# Patient Record
Sex: MALE | Race: WHITE | NOT HISPANIC OR LATINO | Employment: OTHER | URBAN - METROPOLITAN AREA
[De-identification: names, ages, dates, MRNs, and addresses within clinical notes are randomized per-mention and may not be internally consistent; named-entity substitution may affect disease eponyms.]

---

## 2017-02-06 RX ORDER — LATANOPROST 50 UG/ML
SOLUTION/ DROPS OPHTHALMIC
Qty: 3 BOTTLE | Refills: 3 | Status: SHIPPED | OUTPATIENT
Start: 2017-02-06 | End: 2017-07-31 | Stop reason: SDUPTHER

## 2017-04-07 ENCOUNTER — OFFICE VISIT (OUTPATIENT)
Dept: OPHTHALMOLOGY | Facility: CLINIC | Age: 62
End: 2017-04-07
Payer: COMMERCIAL

## 2017-04-07 ENCOUNTER — TELEPHONE (OUTPATIENT)
Dept: OPHTHALMOLOGY | Facility: CLINIC | Age: 62
End: 2017-04-07

## 2017-04-07 DIAGNOSIS — T15.02XA FOREIGN BODY IN CORNEA, LEFT, INITIAL ENCOUNTER: Primary | ICD-10-CM

## 2017-04-07 DIAGNOSIS — H40.1131 PRIMARY OPEN ANGLE GLAUCOMA OF BOTH EYES, MILD STAGE: ICD-10-CM

## 2017-04-07 DIAGNOSIS — H26.9 CORTICAL CATARACT OF BOTH EYES: ICD-10-CM

## 2017-04-07 PROCEDURE — 65222 REMOVE FOREIGN BODY FROM EYE: CPT | Mod: LT,S$GLB,, | Performed by: OPHTHALMOLOGY

## 2017-04-07 PROCEDURE — 99999 PR PBB SHADOW E&M-EST. PATIENT-LVL II: CPT | Mod: PBBFAC,,, | Performed by: OPHTHALMOLOGY

## 2017-04-07 PROCEDURE — 92012 INTRM OPH EXAM EST PATIENT: CPT | Mod: 25,S$GLB,, | Performed by: OPHTHALMOLOGY

## 2017-04-07 RX ORDER — GLIPIZIDE 5 MG/1
5 TABLET ORAL
Status: ON HOLD | COMMUNITY
End: 2023-04-05 | Stop reason: HOSPADM

## 2017-04-07 RX ORDER — LISINOPRIL 5 MG/1
5 TABLET ORAL DAILY
Status: ON HOLD | COMMUNITY
End: 2023-04-05 | Stop reason: HOSPADM

## 2017-04-07 RX ORDER — GATIFLOXACIN 5 MG/ML
1 SOLUTION/ DROPS OPHTHALMIC 4 TIMES DAILY
Qty: 1 BOTTLE | Refills: 1 | Status: SHIPPED | OUTPATIENT
Start: 2017-04-07 | End: 2017-04-14

## 2017-04-07 RX ORDER — ESCITALOPRAM OXALATE 20 MG/1
20 TABLET ORAL DAILY
Status: ON HOLD | COMMUNITY
End: 2023-04-05 | Stop reason: HOSPADM

## 2017-04-07 RX ORDER — ATORVASTATIN CALCIUM 20 MG/1
20 TABLET, FILM COATED ORAL NIGHTLY
COMMUNITY

## 2017-04-07 NOTE — TELEPHONE ENCOUNTER
----- Message from Teresa Lim sent at 4/7/2017  9:35 AM CDT -----  Contact: pt  Calling about eyedrops being to high in cost pls advise [phone] thx

## 2017-04-07 NOTE — TELEPHONE ENCOUNTER
----- Message from Elizabeth Carbajal sent at 4/7/2017  9:28 AM CDT -----  Contact: [facility]   [facility]  [phone],,, calling about patient prescption

## 2017-04-07 NOTE — TELEPHONE ENCOUNTER
L/M ON RECORDER THAT DR. CUELLO IS CHANGING HIM FROM THE GATIFLOXACIN TO POLYTRIM AND TO USE LACRILUBE OINTMENT IN PLACE OF THE TOBRAMYCIN OINTMENT.

## 2017-04-07 NOTE — TELEPHONE ENCOUNTER
----- Message from Elizabeth Carbajal sent at 4/7/2017  9:28 AM CDT -----  Contact: [facility]   [facility]  [phone],,, calling about patient prescption

## 2017-04-07 NOTE — PROGRESS NOTES
HPI     Patient was a patient of yours approx. 2 years ago and left due to change   in insurance now you take his insurance again and he is back to restablish   care with you, patient has been going to [facility] in [place]   and [facility] states he does not have glaucoma, patient was  [employer] 2   weeks ago in [place]  and states he felt  like something went  Into  Os and   is  moving around ,  patient now states he has extreme photophobia for 2-3   days.              Mild Stage Glaucoma  +DM X 1 YEAR?  Glaucoma Laser OD at [place] x early 2015 - no response per patient      OU LATANOPROST QHS        Last edited by Rony Friedman MD on 4/7/2017  8:56 AM.         Assessment /Plan     For exam results, see Encounter Report.      ICD-10-CM ICD-9-CM    1. Foreign body in cornea, left, initial encounter T15.02XA 930.0 Removed today from the left eye cornea, without any difficulties    tobramycin sulfate 0.3% (TOBREX) 0.3 % ophthalmic ointment     E914 gatifloxacin (ZYMAR) 0.5 % Drop drops   2. Primary open angle glaucoma of both eyes, mild stage H40.1131 365.11 GOCT today, will defer IOP today due to abrasion     365.71    3. Cortical cataract of both eyes H26.9 366.9        GOCT Done today     RETURN TO CLINIC Monday     Tobrez QID OS and Gati QID OS

## 2017-04-10 ENCOUNTER — OFFICE VISIT (OUTPATIENT)
Dept: OPHTHALMOLOGY | Facility: CLINIC | Age: 62
End: 2017-04-10
Payer: COMMERCIAL

## 2017-04-10 DIAGNOSIS — H40.1131 PRIMARY OPEN ANGLE GLAUCOMA OF BOTH EYES, MILD STAGE: ICD-10-CM

## 2017-04-10 DIAGNOSIS — T15.02XA FOREIGN BODY IN CORNEA, LEFT, INITIAL ENCOUNTER: Primary | ICD-10-CM

## 2017-04-10 DIAGNOSIS — H26.9 CORTICAL CATARACT OF BOTH EYES: ICD-10-CM

## 2017-04-10 PROCEDURE — 99999 PR PBB SHADOW E&M-EST. PATIENT-LVL II: CPT | Mod: PBBFAC,,, | Performed by: OPHTHALMOLOGY

## 2017-04-10 PROCEDURE — 92012 INTRM OPH EXAM EST PATIENT: CPT | Mod: S$GLB,,, | Performed by: OPHTHALMOLOGY

## 2017-04-10 RX ORDER — POLYMYXIN B SULFATE AND TRIMETHOPRIM 1; 10000 MG/ML; [USP'U]/ML
1 SOLUTION OPHTHALMIC 4 TIMES DAILY
COMMUNITY
End: 2017-04-17 | Stop reason: ALTCHOICE

## 2017-04-10 RX ORDER — OMEPRAZOLE 10 MG/1
10 CAPSULE, DELAYED RELEASE ORAL DAILY
Status: ON HOLD | COMMUNITY
End: 2023-04-05 | Stop reason: HOSPADM

## 2017-04-10 NOTE — PROGRESS NOTES
HPI     F/u K-abrasion OS.    Glaucoma  +DM X 1 YEAR?  Glaucoma Laser OD at [place] x early 2015 - no response per patient  Corneal FB removed OS 04/07/17      OU LATANOPROST QHS  Polytrim qid OS  Refresh gel drop         Last edited by Marcie Thacker on 4/10/2017  9:01 AM.         Assessment /Plan     For exam results, see Encounter Report.      ICD-10-CM ICD-9-CM    1. Foreign body in cornea, left,  T15.02XA 930.0 Removed at last visit - today defect closed, with mild stromal infiltrate still present      E914    2. Primary open angle glaucoma of both eyes, mild stage H40.1131 365.11      365.71    3. Cortical cataract of both eyes H26.9 366.9        RETURN TO CLINIC 1 week  (might check IOP at next visit if k is better)       OU LATANOPROST QHS  Polytrim qid OS  Refresh gel drop

## 2017-04-17 ENCOUNTER — OFFICE VISIT (OUTPATIENT)
Dept: OPHTHALMOLOGY | Facility: CLINIC | Age: 62
End: 2017-04-17
Payer: COMMERCIAL

## 2017-04-17 DIAGNOSIS — Z83.511 FAMILY HISTORY OF OPEN-ANGLE GLAUCOMA: ICD-10-CM

## 2017-04-17 DIAGNOSIS — H26.9 CORTICAL CATARACT OF BOTH EYES: ICD-10-CM

## 2017-04-17 DIAGNOSIS — H40.1131 PRIMARY OPEN ANGLE GLAUCOMA OF BOTH EYES, MILD STAGE: Primary | ICD-10-CM

## 2017-04-17 PROCEDURE — 92012 INTRM OPH EXAM EST PATIENT: CPT | Mod: S$GLB,,, | Performed by: OPHTHALMOLOGY

## 2017-04-17 PROCEDURE — 99999 PR PBB SHADOW E&M-EST. PATIENT-LVL II: CPT | Mod: PBBFAC,,, | Performed by: OPHTHALMOLOGY

## 2017-04-17 RX ORDER — DORZOLAMIDE HYDROCHLORIDE AND TIMOLOL MALEATE 20; 5 MG/ML; MG/ML
1 SOLUTION/ DROPS OPHTHALMIC EVERY 12 HOURS
Qty: 1 BOTTLE | Refills: 6 | Status: SHIPPED | OUTPATIENT
Start: 2017-04-17 | End: 2018-04-04 | Stop reason: SDUPTHER

## 2017-04-17 NOTE — PROGRESS NOTES
HPI     Glaucoma  +DM X 1 YEAR?  Glaucoma Laser OD at [facility] x early 2015 - no response per patient  Corneal FB removed OS 04/07/17      OU LATANOPROST QHS  Polytrim QID OS   Refresh Gel drops OS       Last edited by RITA Sanches on 4/17/2017  8:12 AM.         Assessment /Plan     For exam results, see Encounter Report.      ICD-10-CM ICD-9-CM    1. Primary open angle glaucoma of both eyes, mild stage H40.1131 365.11 IOP not within acceptable range relative to target IOP with risk of irreversible visual loss. Additional treatment required.  Discussed options, risks, and benefits of additional medication, SLT laser, or incisional glaucoma surgery.     recommend additional med    Patient chooses above    Reviewed importance of continued compliance with treatment and follow up.   Start:  dorzolamide-timolol 2-0.5% (COSOPT) 22.3-6.8 mg/mL ophthalmic solution bid ou     365.71    2. Cortical cataract of both eyes H26.9 366.9 follow   3. Family history of open-angle glaucoma Z83.511 V19.11        Please use your drops as follows:    Dorzolamide/Timolol in both eyes(s) twice a day    It is best to place the first drop in your eyes when you first wake up to start your day.      Latanoprost once a day in both eyes(s)    Use this medication at the time of day that is easiest for you to remember.    Please wipe the excess of this medication off the eyelid skin after instillation and place pressure on the lids near your nose for 1-2 minutes after using any of your eye medications.     Return to Clinic 1 months iop

## 2017-06-12 ENCOUNTER — OFFICE VISIT (OUTPATIENT)
Dept: OPHTHALMOLOGY | Facility: CLINIC | Age: 62
End: 2017-06-12
Payer: COMMERCIAL

## 2017-06-12 DIAGNOSIS — H40.1131 PRIMARY OPEN ANGLE GLAUCOMA OF BOTH EYES, MILD STAGE: Primary | ICD-10-CM

## 2017-06-12 DIAGNOSIS — H26.9 CORTICAL CATARACT OF BOTH EYES: ICD-10-CM

## 2017-06-12 DIAGNOSIS — Z83.511 FAMILY HISTORY OF OPEN-ANGLE GLAUCOMA: ICD-10-CM

## 2017-06-12 PROCEDURE — 99999 PR PBB SHADOW E&M-EST. PATIENT-LVL II: CPT | Mod: PBBFAC,,, | Performed by: OPHTHALMOLOGY

## 2017-06-12 PROCEDURE — 92012 INTRM OPH EXAM EST PATIENT: CPT | Mod: S$GLB,,, | Performed by: OPHTHALMOLOGY

## 2017-06-12 NOTE — PROGRESS NOTES
HPI     Here for IOP check.  Trial of add Cosopt.  Had some irritation for a   couple of days. Pt missed his new drop this am, wasn't sure if he should   take it the day of the appt     Glaucoma  +DM X 1 YEAR?  Glaucoma Laser OD at [facility] x early 2015 - no response per patient  Corneal FB removed OS 04/07/17    COSOPT bid OU--Didn't use this a.m.  OU LATANOPROST QHS  Polytrim QID OS   Refresh Gel drops OS    Last edited by Rony Friedman MD on 6/12/2017  9:04 AM. (History)            Assessment /Plan     For exam results, see Encounter Report.      ICD-10-CM ICD-9-CM    1. Primary open angle glaucoma of both eyes, mild stage H40.1131 365.11 IOP much better on 2nd med today  Could consider stopping latanoprost in the future as a trial   Follow for now      365.71    2. Cortical cataract of both eyes H26.9 366.9 Follow    3. Family history of open-angle glaucoma Z83.511 V19.11 Follow        Please use your drops as follows:    Dorzolamide/Timolol in both eyes(s) twice a day    It is best to place the first drop in your eyes when you first wake up to start your day.      Latanoprost once a day in both eyes(s)    Use this medication at the time of day that is easiest for you to remember.    Please wipe the excess of this medication off the eyelid skin after instillation and place pressure on the lids near your nose for 1-2 minutes after using any of your eye medications.     Return to Clinic 2  months DOA, HVF and SDP

## 2017-07-27 ENCOUNTER — OFFICE VISIT (OUTPATIENT)
Dept: OPHTHALMOLOGY | Facility: CLINIC | Age: 62
End: 2017-07-27
Payer: COMMERCIAL

## 2017-07-27 DIAGNOSIS — H25.13 NUCLEAR SCLEROSIS, BILATERAL: ICD-10-CM

## 2017-07-27 DIAGNOSIS — H26.9 CORTICAL CATARACT OF BOTH EYES: ICD-10-CM

## 2017-07-27 DIAGNOSIS — H43.812 PVD (POSTERIOR VITREOUS DETACHMENT), LEFT: Primary | ICD-10-CM

## 2017-07-27 PROCEDURE — 99999 PR PBB SHADOW E&M-EST. PATIENT-LVL II: CPT | Mod: PBBFAC,,, | Performed by: OPHTHALMOLOGY

## 2017-07-27 PROCEDURE — 92014 COMPRE OPH EXAM EST PT 1/>: CPT | Mod: S$GLB,,, | Performed by: OPHTHALMOLOGY

## 2017-07-27 NOTE — PROGRESS NOTES
SUBJECTIVE:   Sixto Joseph is a 62 y.o. male   Uncorrected distance visual acuity was 20/20 in the right eye and 20/20 -2 in the left eye.   Chief Complaint   Patient presents with    Spots and/or Floaters     states he has a semi [place] looking floater in OS. started this am.     Glaucoma     mgm coag pt. latanoprost qhs ou, dorz/timolol bid ou        HPI:  HPI     Spots and/or Floaters    Additional comments: states he has a semi [place] looking floater in OS.   started this am.            Glaucoma    Additional comments: mgm coag pt. latanoprost qhs ou, dorz/timolol bid ou             Comments   Pt states he has been seeing a semi [place] in OS starting this morning. At   first it was just when he turned his head. But then he started noticing it   as well while driving. Also states he sees flashing lights after he   coughs.     Glaucoma  +DM X 1 YEAR?  Glaucoma Laser OD at [facility] x early 2015 - no response per patient  Corneal FB removed OS 04/07/17      OU LATANOPROST QHS, dorz/lea bid ou  Refresh Gel drops OS       Last edited by Monika Santillan MA on 7/27/2017  3:04 PM. (History)        Assessment /Plan :  1. PVD (posterior vitreous detachment), left Patient seen and evaluated for PVD OS. No tears or breaks were seen after careful retinal evaluation. Discussed retinal detachment signs and symptoms including flashes of lights, floaters, perceived curtains or veils. Advised to patient to monitor visual status including increase in flashes and floaters, or the development of visual field changes including curtain and /or veils. Advised patient to RTC urgently if these symptoms occur. Explained the need for follow up exams to the patient even if there are no changes in the symptoms.    Consult Dr. Groves      2. Cortical cataract of both eyes monitor for now   3. Nuclear sclerosis, bilateral monitor for now

## 2017-07-27 NOTE — LETTER
[facility] - Ophthalmology  [address] [place] [address]  Phone: [phone]  Fax: [phone]   July 27, 2017    No Recipients    Patient: Sixto Joseph   MR Number: [id number]   YOB: 1955   Date of Visit: 7/27/2017       Dear Dr. Traore Recipients:    Thank you for referring Sixto Joseph to me for evaluation. Here is my assessment and plan of care:    Assessment:   /    Plan:   :  1. PVD (posterior vitreous detachment), left Patient seen and evaluated for PVD OS. No tears or breaks were seen after careful retinal evaluation. Discussed retinal detachment signs and symptoms including flashes of lights, floaters, perceived curtains or veils. Advised to patient to monitor visual status including increase in flashes and floaters, or the development of visual field changes including curtain and /or veils. Advised patient to RTC urgently if these symptoms occur. Explained the need for follow up exams to the patient even if there are no changes in the symptoms.    Consult Dr. Groves      2. Cortical cataract of both eyes monitor for now   3. Nuclear sclerosis, bilateral monitor for now                     Below you will find my full exam findings. If you have questions, please do not hesitate to call me. I look forward to following Mr. Sixto Joseph along with you.    Sincerely,        Mitchell Lynch MD       CC  No Recipients             Base Eye Exam     Visual Acuity (Snellen - Linear)       Right Left    Dist sc 20/20 20/20 -2          Tonometry (Applanation, 3:10 PM)       Right Left    Pressure 16 15          Target and Max Pressure       Right Left    Target 18 18    Max 26 (4/17/2017) 23 (4/17/2017)          Pupils       Pupils Dark Light Shape React APD    Right PERRL 4 3 Round 1 None    Left PERRL 4 3 Round 1 None          Extraocular Movement       Right Left     Full Full          Neuro/Psych     Oriented x3:  Yes    Mood/Affect:  Normal          Dilation     Both eyes:  2.5%  Phenylephrine, 1% Mydriacyl @ 3:10 PM            Slit Lamp and Fundus Exam     Slit Lamp Exam       Right Left    Lids/Lashes Normal Normal    Conjunctiva/Sclera White and quiet White and quiet    Cornea Clear Clear    Anterior Chamber Deep and quiet Deep and quiet    Iris Round and reactive Round and reactive    Lens 1+ NSC, 2+ Cortical cataract 1+ NSC, 2+ Cortical cataract    Vitreous Normal early PVD with rare vitreous cells          Fundus Exam       Right Left    Disc Normal Normal    C/D Ratio Vertical 0.50+ 0.5    Macula Normal Normal    Vessels Normal Normal    Periphery Normal Inferior RPE pigmetary changes

## 2017-07-27 NOTE — LETTER
[facility] - Ophthalmology  [address] [place] [address]  Phone: [phone]  Fax: [phone]   July 27, 2017    Rober Groves MD  [address] [place] [address]    Patient: Sixto Joseph   MR Number: [id number]   YOB: 1955   Date of Visit: 7/27/2017       Dear Dr. Groves:    Thank you for referring Sixto Joseph to me for evaluation. Here is my assessment and plan of care:    Assessment:   /    Plan:   :  1. PVD (posterior vitreous detachment), left Patient seen and evaluated for PVD OS. No tears or breaks were seen after careful retinal evaluation. Discussed retinal detachment signs and symptoms including flashes of lights, floaters, perceived curtains or veils. Advised to patient to monitor visual status including increase in flashes and floaters, or the development of visual field changes including curtain and /or veils. Advised patient to RTC urgently if these symptoms occur. Explained the need for follow up exams to the patient even if there are no changes in the symptoms.    Consult Dr. Groves      2. Cortical cataract of both eyes monitor for now   3. Nuclear sclerosis, bilateral monitor for now                     Below you will find my full exam findings. If you have questions, please do not hesitate to call me. I look forward to following Mr. Sixto Joseph along with you.    Sincerely,        Mitchell Lynch MD       CC  No Recipients             Base Eye Exam     Visual Acuity (Snellen - Linear)       Right Left    Dist sc 20/20 20/20 -2          Tonometry (Applanation, 3:10 PM)       Right Left    Pressure 16 15          Target and Max Pressure       Right Left    Target 18 18    Max 26 (4/17/2017) 23 (4/17/2017)          Pupils       Pupils Dark Light Shape React APD    Right PERRL 4 3 Round 1 None    Left PERRL 4 3 Round 1 None          Extraocular Movement       Right Left     Full Full          Neuro/Psych     Oriented x3:  Yes    Mood/Affect:   Normal          Dilation     Both eyes:  2.5% Phenylephrine, 1% Mydriacyl @ 3:10 PM            Slit Lamp and Fundus Exam     Slit Lamp Exam       Right Left    Lids/Lashes Normal Normal    Conjunctiva/Sclera White and quiet White and quiet    Cornea Clear Clear    Anterior Chamber Deep and quiet Deep and quiet    Iris Round and reactive Round and reactive    Lens 1+ NSC, 2+ Cortical cataract 1+ NSC, 2+ Cortical cataract    Vitreous Normal early PVD with rare vitreous cells          Fundus Exam       Right Left    Disc Normal Normal    C/D Ratio Vertical 0.50+ 0.5    Macula Normal Normal    Vessels Normal Normal    Periphery Normal Inferior RPE pigmetary changes

## 2017-07-31 RX ORDER — LATANOPROST 50 UG/ML
SOLUTION/ DROPS OPHTHALMIC
Qty: 3 BOTTLE | Refills: 3 | Status: SHIPPED | OUTPATIENT
Start: 2017-07-31 | End: 2017-10-09 | Stop reason: SDUPTHER

## 2017-08-30 ENCOUNTER — OFFICE VISIT (OUTPATIENT)
Dept: OPHTHALMOLOGY | Facility: CLINIC | Age: 62
End: 2017-08-30
Payer: COMMERCIAL

## 2017-08-30 DIAGNOSIS — H40.1131 PRIMARY OPEN ANGLE GLAUCOMA OF BOTH EYES, MILD STAGE: Primary | ICD-10-CM

## 2017-08-30 DIAGNOSIS — H26.9 CORTICAL CATARACT OF BOTH EYES: ICD-10-CM

## 2017-08-30 DIAGNOSIS — H25.13 NUCLEAR SCLEROSIS, BILATERAL: ICD-10-CM

## 2017-08-30 PROCEDURE — 92014 COMPRE OPH EXAM EST PT 1/>: CPT | Mod: S$GLB,,, | Performed by: OPHTHALMOLOGY

## 2017-08-30 PROCEDURE — 92250 FUNDUS PHOTOGRAPHY W/I&R: CPT | Mod: S$GLB,,, | Performed by: OPHTHALMOLOGY

## 2017-08-30 PROCEDURE — 92083 EXTENDED VISUAL FIELD XM: CPT | Mod: S$GLB,,, | Performed by: OPHTHALMOLOGY

## 2017-08-30 PROCEDURE — 99999 PR PBB SHADOW E&M-EST. PATIENT-LVL I: CPT | Mod: PBBFAC,,, | Performed by: OPHTHALMOLOGY

## 2017-08-30 RX ORDER — SITAGLIPTIN 100 MG/1
100 TABLET, FILM COATED ORAL NIGHTLY
COMMUNITY
Start: 2017-08-07

## 2017-08-30 RX ORDER — VENLAFAXINE HYDROCHLORIDE 37.5 MG/1
37.5 CAPSULE, EXTENDED RELEASE ORAL
Status: ON HOLD | COMMUNITY
Start: 2017-08-28 | End: 2023-04-05 | Stop reason: HOSPADM

## 2017-08-30 NOTE — PROGRESS NOTES
HPI     Glaucoma    Additional comments: OU LATANOPROST QHS, dorz/lea bid ou           Diabetic Eye Exam    Additional comments: Latest A1C was 5.4           Comments   Pt here for 2m HVF SDP chk. Pt saw Dr. Groves 7/28/17. Pt states that he   had a laser procedure for his PVD OS. Procedure was successful. Pt says he   didn't see any lines in his vision anymore, but his vision was cloudy and   was seeing flashes of light. Pt says the cloudiness is gone, but the   flashes still appear. No pain or discomfort.  95% compliant with gtts.     Glaucoma  +DM X 1 YEAR?  Glaucoma Laser OD at [place] x early 2015 - no response per patient  Corneal FB removed OS 04/07/17      OU LATANOPROST QHS, dorz/lea bid ou  Refresh Gel drops OS       Last edited by Raul Mendoza, Patient Care Assistant on 8/30/2017  8:43   AM. (History)            Assessment /Plan     For exam results, see Encounter Report.      ICD-10-CM ICD-9-CM    1. Primary open angle glaucoma of both eyes, mild stage H40.1131 365.11 Wilkins Visual Field - OU - Extended - Both Eyes     365.71 Color Fundus Photography - OU - Both Eyes    Doing well - intraocular pressure is within acceptable range relative to target pressure with no evidence of progression.   Continue current treatment.  Reviewed importance of continued compliance with treatment and follow up.       Will stop latanoprost pt may not need drop   2. Cortical cataract of both eyes H26.9 366.9 Will cont to follow   3. Nuclear sclerosis, bilateral H25.13 366.16 As above     D/C  LATANOPROST QHS   dorz/lea bid ou  Refresh Gel drops OS     RETURN TO CLINIC 1 month IOP check

## 2017-10-09 ENCOUNTER — OFFICE VISIT (OUTPATIENT)
Dept: OPHTHALMOLOGY | Facility: CLINIC | Age: 62
End: 2017-10-09
Payer: COMMERCIAL

## 2017-10-09 DIAGNOSIS — H26.9 CORTICAL CATARACT OF BOTH EYES: ICD-10-CM

## 2017-10-09 DIAGNOSIS — H40.1131 PRIMARY OPEN ANGLE GLAUCOMA OF BOTH EYES, MILD STAGE: Primary | ICD-10-CM

## 2017-10-09 DIAGNOSIS — Z83.511 FAMILY HISTORY OF OPEN-ANGLE GLAUCOMA: ICD-10-CM

## 2017-10-09 PROCEDURE — 92012 INTRM OPH EXAM EST PATIENT: CPT | Mod: S$GLB,,, | Performed by: OPHTHALMOLOGY

## 2017-10-09 PROCEDURE — 99999 PR PBB SHADOW E&M-EST. PATIENT-LVL I: CPT | Mod: PBBFAC,,, | Performed by: OPHTHALMOLOGY

## 2017-10-09 RX ORDER — LATANOPROST 50 UG/ML
1 SOLUTION/ DROPS OPHTHALMIC DAILY
Qty: 1 BOTTLE | Refills: 11 | Status: SHIPPED | OUTPATIENT
Start: 2017-10-09 | End: 2018-04-04 | Stop reason: SDUPTHER

## 2017-10-09 NOTE — PROGRESS NOTES
HPI     1 month IOP check.  Trial of DC latanoprost.  No vision changes.    Glaucoma  +DM X 1 YEAR?  Glaucoma Laser OD at [facility] x early 2015 - no response per patient  Corneal FB removed OS 04/07/17  Retinal laser ([facility] 2017)       dorz/lea bid ou  Refresh Gel drops OS    Last edited by Marcie Thacker on 10/9/2017  8:15 AM. (History)            Assessment /Plan     For exam results, see Encounter Report.      ICD-10-CM ICD-9-CM    1. Primary open angle glaucoma of both eyes, mild stage H40.1131 365.11 Needs IOP Lower, will need to add 2nd med-     latanoprost 0.005 % ophthalmic solution     365.71    2. Cortical cataract of both eyes H26.9 366.9 Follow    3. Family history of open-angle glaucoma Z83.511 V19.11        Please use your drops as follows:    Dorzolamide/Timolol in both eyes(s) twice a day    It is best to place the first drop in your eyes when you first wake up to start your day.      Latanoprost once a day in both eyes(s)    Use this medication at the time of day that is easiest for you to remember.    Please wipe the excess of this medication off the eyelid skin after instillation and place pressure on the lids near your nose for 1-2 minutes after using any of your eye medications.     Return to Clinic 4 months IOP at [facility]

## 2017-11-02 ENCOUNTER — PATIENT OUTREACH (OUTPATIENT)
Dept: ADMINISTRATIVE | Facility: HOSPITAL | Age: 62
End: 2017-11-02

## 2018-02-05 ENCOUNTER — OFFICE VISIT (OUTPATIENT)
Dept: OPHTHALMOLOGY | Facility: CLINIC | Age: 63
End: 2018-02-05
Payer: MEDICAID

## 2018-02-05 DIAGNOSIS — H40.1131 PRIMARY OPEN ANGLE GLAUCOMA OF BOTH EYES, MILD STAGE: Primary | ICD-10-CM

## 2018-02-05 PROCEDURE — 99999 PR PBB SHADOW E&M-EST. PATIENT-LVL I: CPT | Mod: PBBFAC,,, | Performed by: OPHTHALMOLOGY

## 2018-02-05 PROCEDURE — 99211 OFF/OP EST MAY X REQ PHY/QHP: CPT | Mod: PBBFAC | Performed by: OPHTHALMOLOGY

## 2018-02-05 PROCEDURE — 92012 INTRM OPH EXAM EST PATIENT: CPT | Mod: S$PBB,,, | Performed by: OPHTHALMOLOGY

## 2018-02-12 ENCOUNTER — TELEPHONE (OUTPATIENT)
Dept: OPHTHALMOLOGY | Facility: CLINIC | Age: 63
End: 2018-02-12

## 2018-02-12 NOTE — TELEPHONE ENCOUNTER
----- Message from Adia Stein sent at 2/12/2018  2:14 PM CST -----  Contact: [facility]  She's calling stating that the Rx Dorzo/Timol 2/0.5% eye drops are on back order, wanted to know if something else could be given, please advise [phone]

## 2018-04-04 DIAGNOSIS — H40.1131 PRIMARY OPEN ANGLE GLAUCOMA OF BOTH EYES, MILD STAGE: ICD-10-CM

## 2018-04-04 RX ORDER — LATANOPROST 50 UG/ML
1 SOLUTION/ DROPS OPHTHALMIC DAILY
Qty: 1 BOTTLE | Refills: 11 | Status: SHIPPED | OUTPATIENT
Start: 2018-04-04 | End: 2018-08-07 | Stop reason: SDUPTHER

## 2018-04-04 RX ORDER — DORZOLAMIDE HYDROCHLORIDE AND TIMOLOL MALEATE 20; 5 MG/ML; MG/ML
1 SOLUTION/ DROPS OPHTHALMIC 2 TIMES DAILY
Qty: 1 BOTTLE | Refills: 6 | Status: SHIPPED | OUTPATIENT
Start: 2018-04-04 | End: 2018-10-12 | Stop reason: SDUPTHER

## 2018-04-04 NOTE — TELEPHONE ENCOUNTER
----- Message from Shon Mckinley sent at 4/4/2018  1:38 PM CDT -----  Contact: Pt  Pt needs a refill on Rx eye drops no further information was given.    Pt can be reached at [phone].    Thank You.

## 2018-04-04 NOTE — TELEPHONE ENCOUNTER
----- Message from Crissy Quiroz sent at 4/4/2018  2:17 PM CDT -----  Contact: Pt   States he's calling rg his eye drops and told him that it was called in to the [facility]. Pt states if the medicine was called in to [facility] to pls have to sent to the [facility] and can be reached at [phone]//thanks/dbw

## 2018-06-04 DIAGNOSIS — H40.1131 PRIMARY OPEN ANGLE GLAUCOMA OF BOTH EYES, MILD STAGE: ICD-10-CM

## 2018-06-04 RX ORDER — DORZOLAMIDE HYDROCHLORIDE AND TIMOLOL MALEATE 20; 5 MG/ML; MG/ML
SOLUTION/ DROPS OPHTHALMIC
Qty: 10 ML | Refills: 6 | Status: SHIPPED | OUTPATIENT
Start: 2018-06-04 | End: 2018-08-07 | Stop reason: SDUPTHER

## 2018-08-07 ENCOUNTER — APPOINTMENT (OUTPATIENT)
Dept: OPHTHALMOLOGY | Facility: CLINIC | Age: 63
End: 2018-08-07
Payer: MEDICAID

## 2018-08-07 ENCOUNTER — OFFICE VISIT (OUTPATIENT)
Dept: OPHTHALMOLOGY | Facility: CLINIC | Age: 63
End: 2018-08-07
Payer: MEDICAID

## 2018-08-07 DIAGNOSIS — H26.9 CORTICAL CATARACT OF BOTH EYES: ICD-10-CM

## 2018-08-07 DIAGNOSIS — H40.1131 PRIMARY OPEN ANGLE GLAUCOMA OF BOTH EYES, MILD STAGE: Primary | ICD-10-CM

## 2018-08-07 DIAGNOSIS — Z83.511 FAMILY HISTORY OF OPEN-ANGLE GLAUCOMA: ICD-10-CM

## 2018-08-07 PROCEDURE — 99999 PR PBB SHADOW E&M-EST. PATIENT-LVL II: CPT | Mod: PBBFAC,,, | Performed by: OPHTHALMOLOGY

## 2018-08-07 PROCEDURE — 92083 EXTENDED VISUAL FIELD XM: CPT | Mod: PBBFAC,PO | Performed by: OPHTHALMOLOGY

## 2018-08-07 PROCEDURE — 92014 COMPRE OPH EXAM EST PT 1/>: CPT | Mod: S$PBB,,, | Performed by: OPHTHALMOLOGY

## 2018-08-07 PROCEDURE — 99212 OFFICE O/P EST SF 10 MIN: CPT | Mod: PBBFAC,PO | Performed by: OPHTHALMOLOGY

## 2018-08-07 PROCEDURE — 92250 FUNDUS PHOTOGRAPHY W/I&R: CPT | Mod: PBBFAC,PO | Performed by: OPHTHALMOLOGY

## 2018-08-07 RX ORDER — LATANOPROST 50 UG/ML
1 SOLUTION/ DROPS OPHTHALMIC DAILY
Qty: 1 BOTTLE | Refills: 11 | Status: SHIPPED | OUTPATIENT
Start: 2018-08-07 | End: 2019-08-27 | Stop reason: SDUPTHER

## 2018-08-07 NOTE — PROGRESS NOTES
HPI     Glaucoma    Additional comments: 6 month HVF/SDP, Cosopt BID OU, Latanoprost QHS OU           Comments   Pt states he has trouble see road signs x 3-5 months. States the cosopt   dries on his eye lids and think that may be causing the problems.     Glaucoma  +DM X 1 YEAR?  Glaucoma Laser OD at [facility] x early 2015 - no response per patient  Corneal FB removed OS 04/07/17  [facility]      OU LATANOPROST QHS  OU DORZOLAMIDE/TIMOLOL BID OU   Refresh Gel drops OS       Last edited by John Miles on 8/7/2018  8:42 AM. (History)            Assessment /Plan     For exam results, see Encounter Report.      ICD-10-CM ICD-9-CM    1. Primary open angle glaucoma of both eyes, mild stage H40.1131 365.11 Wilkins Visual Field - OU - Extended - Both Eyes     365.71 Color Fundus Photography - OU - Both Eyes Done today   Doing well - intraocular pressure is within acceptable range relative to target pressure with no evidence of progression.   Continue current treatment.  Reviewed importance of continued compliance with treatment and follow up.         latanoprost 0.005 % ophthalmic solution   2. Cortical cataract of both eyes H26.9 366.9   You were found to have an early cataract in your eye(s) today, however the cataract is not affecting your activities of daily living, such as reading and driving.You do not need  surgery at this time. We will recheck your cataract at your next visit. You are welcome to call for an earlier appointment if your vision gets worse.      3. Family history of open-angle glaucoma Z83.511 V19.11 Follow        Please use your drops as follows:    Dorzolamide/Timolol in both eyes(s) twice a day    It is best to place the first drop in your eyes when you first wake up to start your day.      Latanoprost once a day in both eyes(s)    Use this medication at the time of day that is easiest for you to remember.    Please wipe the excess of this medication off the eyelid skin after  instillation and place pressure on the lids near your nose for 1-2 minutes after using any of your eye medications.     Return to Clinic 6 months IOP at [facility]

## 2018-10-12 ENCOUNTER — TELEPHONE (OUTPATIENT)
Dept: OPHTHALMOLOGY | Facility: CLINIC | Age: 63
End: 2018-10-12

## 2018-10-12 DIAGNOSIS — H40.1131 PRIMARY OPEN ANGLE GLAUCOMA OF BOTH EYES, MILD STAGE: ICD-10-CM

## 2018-10-12 NOTE — TELEPHONE ENCOUNTER
Notified patient that Cosopt is available at [facility] and he would like us to send it there for  on Monday 10-15-18  Phone in Cosopt (generic) 1 drop both eyes 2x daily with refills

## 2018-10-15 RX ORDER — DORZOLAMIDE HYDROCHLORIDE AND TIMOLOL MALEATE 20; 5 MG/ML; MG/ML
1 SOLUTION/ DROPS OPHTHALMIC 2 TIMES DAILY
Qty: 1 BOTTLE | Refills: 6 | Status: SHIPPED | OUTPATIENT
Start: 2018-10-15 | End: 2020-12-17

## 2018-10-17 NOTE — PROGRESS NOTES
10/16/18 1732   Interdisciplinary Plan of Care-Goals (Indications)   Bronchodilator Indications History / Diagnosis   Interdisciplinary Plan of Care-Outcomes    Bronchodilator Outcome Patient at Stable Baseline   Therapy Not Performed   Type of Therapy Not Performed SVN   Reason Therapy Not Performed PRN, No Indication   MDI/DPI Group   #MDI/DPI Given MDI/DPI x 1   Respiratory WDL   Respiratory (WDL) X   Chest Exam   Respiration 16   Pulse 75   Breath Sounds   RUL Breath Sounds Clear   RML Breath Sounds Clear   RLL Breath Sounds Diminished   MARTÍNEZ Breath Sounds Clear   LLL Breath Sounds Diminished   Oximetry   #Pulse Oximetry (Single Determination) Yes   Oxygen   Home O2 Use Prior To Admission? Yes   Home O2 LPM Flow 2 LPM   Home O2 Delivery Method Nasal Cannula   Home O2 Frequency of Use At Sleep   Pulse Oximetry 96 %   O2 Daily Delivery Respiratory  Room Air with O2 Available      HPI     Glaucoma  +DM X 1 YEAR?  Glaucoma Laser OD at [facility] x early 2015 - no response per patient  Corneal FB removed OS 04/07/17  [facility]      OU LATANOPROST QHS  OU DORZOLAMIDE/TIMOLOL BID OU   Refresh Gel drops OS    Last edited by RITA Sanches on 2/5/2018  8:06 AM. (History)            Assessment /Plan     For exam results, see Encounter Report.      ICD-10-CM ICD-9-CM    1. Primary open angle glaucoma of both eyes, mild stage H40.1131 365.11 Doing well - intraocular pressure is within acceptable range relative to target pressure with no evidence of progression.   Continue current treatment.  Reviewed importance of continued compliance with treatment and follow up.        365.71        Please use your drops as follows:    Dorzolamide/Timolol in both eyes(s) twice a day    It is best to place the first drop in your eyes when you first wake up to start your day.      Latanoprost once a day in both eyes(s)    Use this medication at the time of day that is easiest for you to remember.    Please wipe the excess of this medication off the eyelid skin after instillation and place pressure on the lids near your nose for 1-2 minutes after using any of your eye medications.     Return to Clinic 6 months DOA, HVF and SDP [facility]

## 2018-10-23 ENCOUNTER — TELEPHONE (OUTPATIENT)
Dept: OPHTHALMOLOGY | Facility: CLINIC | Age: 63
End: 2018-10-23

## 2018-10-23 NOTE — TELEPHONE ENCOUNTER
----- Message from Geovanna Ding sent at 10/23/2018  8:26 AM CDT -----  Contact: Self  He misplaced his eye drops and needs to get another prescription called in to the [facility] [address] like last time. He is requesting a call back to discuss.

## 2019-02-04 ENCOUNTER — OFFICE VISIT (OUTPATIENT)
Dept: OPHTHALMOLOGY | Facility: CLINIC | Age: 64
End: 2019-02-04
Payer: MEDICAID

## 2019-02-04 DIAGNOSIS — H40.1131 PRIMARY OPEN ANGLE GLAUCOMA OF BOTH EYES, MILD STAGE: Primary | ICD-10-CM

## 2019-02-04 DIAGNOSIS — H43.812 PVD (POSTERIOR VITREOUS DETACHMENT), LEFT: ICD-10-CM

## 2019-02-04 DIAGNOSIS — Z83.511 FAMILY HISTORY OF OPEN-ANGLE GLAUCOMA: ICD-10-CM

## 2019-02-04 DIAGNOSIS — H26.9 CORTICAL CATARACT OF BOTH EYES: ICD-10-CM

## 2019-02-04 DIAGNOSIS — E11.9 TYPE 2 DIABETES MELLITUS WITHOUT COMPLICATION, WITHOUT LONG-TERM CURRENT USE OF INSULIN: ICD-10-CM

## 2019-02-04 PROCEDURE — 92014 COMPRE OPH EXAM EST PT 1/>: CPT | Mod: S$PBB,,, | Performed by: OPHTHALMOLOGY

## 2019-02-04 PROCEDURE — 99999 PR PBB SHADOW E&M-EST. PATIENT-LVL I: CPT | Mod: PBBFAC,,, | Performed by: OPHTHALMOLOGY

## 2019-02-04 PROCEDURE — 92014 PR EYE EXAM, EST PATIENT,COMPREHESV: ICD-10-PCS | Mod: S$PBB,,, | Performed by: OPHTHALMOLOGY

## 2019-02-04 PROCEDURE — 99999 PR PBB SHADOW E&M-EST. PATIENT-LVL I: ICD-10-PCS | Mod: PBBFAC,,, | Performed by: OPHTHALMOLOGY

## 2019-02-04 PROCEDURE — 99211 OFF/OP EST MAY X REQ PHY/QHP: CPT | Mod: PBBFAC | Performed by: OPHTHALMOLOGY

## 2019-02-04 RX ORDER — EMPAGLIFLOZIN 10 MG/1
10 TABLET, FILM COATED ORAL NIGHTLY
COMMUNITY
Start: 2019-01-08

## 2019-02-04 NOTE — PROGRESS NOTES
HPI     Glaucoma      Additional comments: 6 month IOP check              Comments     The patient states his eyes are feeling okay but the dorz/lea burns his   eyes very bad but it burns the right eye more than the left eye. The   patient also states his left eye will have a flash of light when he looks   to the side or he moves his head but it also happened a few years ago and   he had laser SX on his retina so he wants to make sure that is not   happening again. Has had some flashes over the past month but it is not   constant - occurs every few days. He only notices it when he wakes early   in the am when it is dark.   100% drop compliance  Glaucoma  +DM X 1 YEAR?  Glaucoma Laser OD at [place] x early 2015 - no response per patient  Corneal FB removed OS 04/07/17  Retinal laser ([place] 2017)      OU LATANOPROST QHS  OU DORZOLAMIDE/TIMOLOL BID OU   Refresh Gel drops OS          Last edited by Rony Friedman MD on 2/4/2019  8:21 AM. (History)            Assessment /Plan     For exam results, see Encounter Report.      ICD-10-CM ICD-9-CM    1. Primary open angle glaucoma of both eyes, mild stage H40.1131 365.11 Doing well - intraocular pressure is within acceptable range relative to target pressure with no evidence of progression.   Continue current treatment.  Reviewed importance of continued compliance with treatment and follow up.        365.71    2. PVD (posterior vitreous detachment), left H43.812 379.21 No hole or tears seen, keep appt with Dr Groves and call his office ASAP if he noticed any sxs increases    3. Cortical cataract of both eyes H26.9 366.9 Follow at this time    4. Family history of open-angle glaucoma Z83.511 V19.11 Follow    5. Type 2 diabetes mellitus without complication, without long-term current use of insulin E11.9 250.00 Diabetes with no diabetic retinopathy on dilated exam.   Reviewed diabetic eye precautions including excellent blood sugar control, and importance of regular  follow up.              RETURN TO CLINIC 4 month, HVF, GOCT and IOP       OU LATANOPROST QHS  OU DORZOLAMIDE/TIMOLOL BID OU

## 2019-06-01 DIAGNOSIS — H40.1131 PRIMARY OPEN ANGLE GLAUCOMA OF BOTH EYES, MILD STAGE: ICD-10-CM

## 2019-06-03 RX ORDER — DORZOLAMIDE HYDROCHLORIDE AND TIMOLOL MALEATE 20; 5 MG/ML; MG/ML
SOLUTION/ DROPS OPHTHALMIC
Qty: 10 ML | Refills: 6 | Status: SHIPPED | OUTPATIENT
Start: 2019-06-03 | End: 2020-04-02

## 2019-06-07 ENCOUNTER — APPOINTMENT (OUTPATIENT)
Dept: OPHTHALMOLOGY | Facility: CLINIC | Age: 64
End: 2019-06-07
Payer: MEDICAID

## 2019-06-07 ENCOUNTER — OFFICE VISIT (OUTPATIENT)
Dept: OPHTHALMOLOGY | Facility: CLINIC | Age: 64
End: 2019-06-07
Payer: MEDICAID

## 2019-06-07 DIAGNOSIS — H40.1131 PRIMARY OPEN ANGLE GLAUCOMA OF BOTH EYES, MILD STAGE: Primary | ICD-10-CM

## 2019-06-07 DIAGNOSIS — H26.9 CORTICAL CATARACT OF BOTH EYES: ICD-10-CM

## 2019-06-07 DIAGNOSIS — Z83.511 FAMILY HISTORY OF OPEN-ANGLE GLAUCOMA: ICD-10-CM

## 2019-06-07 DIAGNOSIS — E11.9 TYPE 2 DIABETES MELLITUS WITHOUT COMPLICATION, WITHOUT LONG-TERM CURRENT USE OF INSULIN: ICD-10-CM

## 2019-06-07 PROCEDURE — 92012 INTRM OPH EXAM EST PATIENT: CPT | Mod: S$PBB,,, | Performed by: OPHTHALMOLOGY

## 2019-06-07 PROCEDURE — 92083 HUMPHREY VISUAL FIELD - OU - BOTH EYES: ICD-10-PCS | Mod: 26,S$PBB,, | Performed by: OPHTHALMOLOGY

## 2019-06-07 PROCEDURE — 99999 PR PBB SHADOW E&M-EST. PATIENT-LVL I: CPT | Mod: PBBFAC,,, | Performed by: OPHTHALMOLOGY

## 2019-06-07 PROCEDURE — 99999 PR PBB SHADOW E&M-EST. PATIENT-LVL I: ICD-10-PCS | Mod: PBBFAC,,, | Performed by: OPHTHALMOLOGY

## 2019-06-07 PROCEDURE — 92012 PR EYE EXAM, EST PATIENT,INTERMED: ICD-10-PCS | Mod: S$PBB,,, | Performed by: OPHTHALMOLOGY

## 2019-06-07 PROCEDURE — 92133 CPTRZD OPH DX IMG PST SGM ON: CPT | Mod: PBBFAC | Performed by: OPHTHALMOLOGY

## 2019-06-07 PROCEDURE — 99211 OFF/OP EST MAY X REQ PHY/QHP: CPT | Mod: PBBFAC,25 | Performed by: OPHTHALMOLOGY

## 2019-06-07 PROCEDURE — 92083 EXTENDED VISUAL FIELD XM: CPT | Mod: PBBFAC | Performed by: OPHTHALMOLOGY

## 2019-06-07 PROCEDURE — 92133 POSTERIOR SEGMENT OCT OPTIC NERVE(OCULAR COHERENCE TOMOGRAPHY) - OU - BOTH EYES: ICD-10-PCS | Mod: 26,S$PBB,, | Performed by: OPHTHALMOLOGY

## 2019-06-07 NOTE — PROGRESS NOTES
HPI     Glaucoma      Additional comments: 4 Month HVF, GOCT, IOP CHECK              Comments     Patient states no visual complaints & no discomfort. 100% drops   compliance     Glaucoma  +DM X 1 YEAR?  Glaucoma Laser OD at [place] x early 2015 - no response per patient  Corneal FB removed OS 04/07/17  [facility]      OU LATANOPROST QHS  OU DORZOLAMIDE/TIMOLOL BID  Refresh Gel drops OS          Last edited by Jacey Pepe on 6/7/2019  8:36 AM. (History)            Assessment /Plan     For exam results, see Encounter Report.      ICD-10-CM ICD-9-CM    1. Primary open angle glaucoma of both eyes, mild stage H40.1131 365.11 Posterior Segment OCT Optic Nerve- Both eyes     365.71 Wilkins Visual Field - OU - Extended - Both Eyes  Doing well - intraocular pressure is within acceptable range relative to target pressure with no evidence of progression.   Continue current treatment.  Reviewed importance of continued compliance with treatment and follow up.      2. Cortical cataract of both eyes H26.9 366.9 follow   3. Type 2 diabetes mellitus without complication, without long-term current use of insulin E11.9 250.00 Not dilated today   4. Family history of open-angle glaucoma Z83.511 V19.11        OU LATANOPROST QHS  OU DORZOLAMIDE/TIMOLOL BID  Return to clinic 4 months for iop OS

## 2019-06-21 ENCOUNTER — PATIENT OUTREACH (OUTPATIENT)
Dept: ADMINISTRATIVE | Facility: HOSPITAL | Age: 64
End: 2019-06-21

## 2019-07-05 ENCOUNTER — OFFICE VISIT (OUTPATIENT)
Dept: INTERNAL MEDICINE | Facility: CLINIC | Age: 64
End: 2019-07-05
Payer: MEDICAID

## 2019-07-05 VITALS
HEART RATE: 95 BPM | BODY MASS INDEX: 30.05 KG/M2 | OXYGEN SATURATION: 95 % | HEIGHT: 70 IN | TEMPERATURE: 98 F | DIASTOLIC BLOOD PRESSURE: 78 MMHG | SYSTOLIC BLOOD PRESSURE: 110 MMHG | WEIGHT: 209.88 LBS

## 2019-07-05 DIAGNOSIS — G56.92 NEUROPATHY OF HAND, LEFT: Primary | ICD-10-CM

## 2019-07-05 PROCEDURE — 99203 PR OFFICE/OUTPT VISIT, NEW, LEVL III, 30-44 MIN: ICD-10-PCS | Mod: S$PBB,,, | Performed by: PHYSICIAN ASSISTANT

## 2019-07-05 PROCEDURE — 99203 OFFICE O/P NEW LOW 30 MIN: CPT | Mod: S$PBB,,, | Performed by: PHYSICIAN ASSISTANT

## 2019-07-05 PROCEDURE — 99999 PR PBB SHADOW E&M-EST. PATIENT-LVL IV: CPT | Mod: PBBFAC,,, | Performed by: PHYSICIAN ASSISTANT

## 2019-07-05 PROCEDURE — 99999 PR PBB SHADOW E&M-EST. PATIENT-LVL IV: ICD-10-PCS | Mod: PBBFAC,,, | Performed by: PHYSICIAN ASSISTANT

## 2019-07-05 PROCEDURE — 99214 OFFICE O/P EST MOD 30 MIN: CPT | Mod: PBBFAC | Performed by: PHYSICIAN ASSISTANT

## 2019-07-05 RX ORDER — GABAPENTIN 100 MG/1
100 CAPSULE ORAL 3 TIMES DAILY
Refills: 0 | COMMUNITY
Start: 2019-06-24 | End: 2021-06-22

## 2019-07-05 RX ORDER — ALLOPURINOL 300 MG/1
450 TABLET ORAL NIGHTLY
COMMUNITY

## 2019-07-05 NOTE — PROGRESS NOTES
Subjective:       Patient ID: Sixto Joseph is a 64 y.o. male.    Chief Complaint: referral (for MD to [facility]) and Hand Pain (in his left hand. He said its constant. started in december. He said seems like the more he works with his hand the worst it gets)    Hand Pain    The incident occurred more than 1 week ago. There was no injury mechanism. The pain is present in the left hand. The quality of the pain is described as shooting. The pain radiates to the left arm. The pain is moderate. The pain has been fluctuating since the incident. Associated symptoms include muscle weakness, numbness and tingling. Pertinent negatives include no chest pain. The symptoms are aggravated by lifting. He has tried NSAIDs and rest (History of left CTS) for the symptoms.     Past Medical History:   Diagnosis Date    Arthritis     Circumcision complication     Cortical cataract of both eyes 10/19/2015    Glaucoma     Gout     Type 2 diabetes mellitus without complication, without long-term current use of insulin 2/4/2019       Review of Systems   Constitutional: Negative for chills, fatigue and fever.   Respiratory: Negative for chest tightness and shortness of breath.    Cardiovascular: Negative for chest pain.   Gastrointestinal: Negative for abdominal pain.   Neurological: Positive for tingling and numbness.       Objective:      Physical Exam   Constitutional: He appears well-developed and well-nourished. No distress.   Cardiovascular: Normal rate and regular rhythm.   Pulmonary/Chest: Effort normal and breath sounds normal.   Abdominal: Soft. Bowel sounds are normal.   Musculoskeletal:        Left wrist: He exhibits tenderness. He exhibits normal range of motion and no swelling.        Left hand: Decreased strength noted.   Skin: He is not diaphoretic.   Nursing note and vitals reviewed.      Assessment:       1. Neuropathy of hand, left        Plan:       Neuropathy of hand, left       Suggest to increase Neurontin to  300 mg tid

## 2019-08-27 DIAGNOSIS — H40.1131 PRIMARY OPEN ANGLE GLAUCOMA OF BOTH EYES, MILD STAGE: ICD-10-CM

## 2019-08-27 RX ORDER — LATANOPROST 50 UG/ML
SOLUTION/ DROPS OPHTHALMIC
Qty: 3 BOTTLE | Refills: 3 | Status: SHIPPED | OUTPATIENT
Start: 2019-08-27 | End: 2019-12-19

## 2019-10-07 ENCOUNTER — OFFICE VISIT (OUTPATIENT)
Dept: OPHTHALMOLOGY | Facility: CLINIC | Age: 64
End: 2019-10-07
Payer: MEDICAID

## 2019-10-07 DIAGNOSIS — H40.1131 PRIMARY OPEN ANGLE GLAUCOMA OF BOTH EYES, MILD STAGE: Primary | ICD-10-CM

## 2019-10-07 DIAGNOSIS — H52.7 REFRACTION DISORDER: ICD-10-CM

## 2019-10-07 DIAGNOSIS — E11.9 TYPE 2 DIABETES MELLITUS WITHOUT COMPLICATION, WITHOUT LONG-TERM CURRENT USE OF INSULIN: ICD-10-CM

## 2019-10-07 DIAGNOSIS — H02.831 DERMATOCHALASIS OF BOTH UPPER EYELIDS: ICD-10-CM

## 2019-10-07 DIAGNOSIS — Z83.511 FAMILY HISTORY OF OPEN-ANGLE GLAUCOMA: ICD-10-CM

## 2019-10-07 DIAGNOSIS — H26.9 CORTICAL CATARACT OF BOTH EYES: ICD-10-CM

## 2019-10-07 DIAGNOSIS — H02.834 DERMATOCHALASIS OF BOTH UPPER EYELIDS: ICD-10-CM

## 2019-10-07 DIAGNOSIS — H02.403 PTOSIS OF BOTH EYELIDS: ICD-10-CM

## 2019-10-07 PROCEDURE — 99999 PR PBB SHADOW E&M-EST. PATIENT-LVL I: ICD-10-PCS | Mod: PBBFAC,,, | Performed by: OPHTHALMOLOGY

## 2019-10-07 PROCEDURE — 92014 PR EYE EXAM, EST PATIENT,COMPREHESV: ICD-10-PCS | Mod: S$PBB,,, | Performed by: OPHTHALMOLOGY

## 2019-10-07 PROCEDURE — 99999 PR PBB SHADOW E&M-EST. PATIENT-LVL I: CPT | Mod: PBBFAC,,, | Performed by: OPHTHALMOLOGY

## 2019-10-07 PROCEDURE — 99211 OFF/OP EST MAY X REQ PHY/QHP: CPT | Mod: PBBFAC | Performed by: OPHTHALMOLOGY

## 2019-10-07 PROCEDURE — 92014 COMPRE OPH EXAM EST PT 1/>: CPT | Mod: S$PBB,,, | Performed by: OPHTHALMOLOGY

## 2019-10-07 RX ORDER — GABAPENTIN 400 MG/1
CAPSULE ORAL
COMMUNITY
Start: 2019-09-16 | End: 2021-06-22

## 2019-10-07 NOTE — PROGRESS NOTES
HPI     Glaucoma      Additional comments: 4M IOP check              Comments     The patient states his eyes are doing fine and he denies any ocular pain   at this time.  100% drop compliance. States he is not seeing as well at a distance.  1. Glaucoma  Glaucoma Laser OD at [place] x early 2015 - no response per patient  2. +DM   3. Corneal FB removed OS 04/07/17  4. Retinal laser ([place] 2017)  5. NSC OU      OU LATANOPROST QHS  OU DORZOLAMIDE/TIMOLOL BID  Refresh Gel drops OS          Last edited by Rony Friedman MD on 10/7/2019  8:30 AM. (History)            Assessment /Plan     For exam results, see Encounter Report.      ICD-10-CM ICD-9-CM    1. Primary open angle glaucoma of both eyes, mild stage H40.1131 365.11 Doing well - intraocular pressure is within acceptable range relative to target pressure with no evidence of progression.   Continue current treatment.  Reviewed importance of continued compliance with treatment and follow up.        365.71    2. Cortical cataract of both eyes H26.9 366.9 Follow for now - worse OS than OD - discussed and not yet surgical   3. Type 2 diabetes mellitus without complication, without long-term current use of insulin E11.9 250.00 Diabetes with no diabetic retinopathy on dilated exam.   Reviewed diabetic eye precautions including excellent blood sugar control, and importance of regular follow up.          4. Family history of open-angle glaucoma Z83.511 V19.11    5. Refraction disorder H52.7 367.9 New Rx did not help   6.      Dermatochalasis/ Ptosis  - pt desires lid consult- kelsey with pt that after phaco it could alter the position of the eyelid- and best to have phaco first then consult       OU LATANOPROST QHS  OU DORZOLAMIDE/TIMOLOL BID  Refresh Gel drops OS     RETURN TO CLINIC 4 month IOP   Pt seeing Dr Mena next months

## 2019-11-11 ENCOUNTER — OFFICE VISIT (OUTPATIENT)
Dept: URGENT CARE | Facility: CLINIC | Age: 64
End: 2019-11-11
Payer: MEDICAID

## 2019-11-11 VITALS
WEIGHT: 219.06 LBS | SYSTOLIC BLOOD PRESSURE: 152 MMHG | HEART RATE: 58 BPM | OXYGEN SATURATION: 97 % | TEMPERATURE: 97 F | BODY MASS INDEX: 31.36 KG/M2 | DIASTOLIC BLOOD PRESSURE: 96 MMHG | HEIGHT: 70 IN

## 2019-11-11 DIAGNOSIS — E11.9 TYPE 2 DIABETES MELLITUS WITHOUT COMPLICATION, WITHOUT LONG-TERM CURRENT USE OF INSULIN: ICD-10-CM

## 2019-11-11 DIAGNOSIS — M54.32 SCIATICA OF LEFT SIDE: ICD-10-CM

## 2019-11-11 DIAGNOSIS — M54.9 OTHER ACUTE BACK PAIN: Primary | ICD-10-CM

## 2019-11-11 PROCEDURE — 99214 PR OFFICE/OUTPT VISIT, EST, LEVL IV, 30-39 MIN: ICD-10-PCS | Mod: S$PBB,,, | Performed by: NURSE PRACTITIONER

## 2019-11-11 PROCEDURE — 99999 PR PBB SHADOW E&M-EST. PATIENT-LVL V: ICD-10-PCS | Mod: PBBFAC,,, | Performed by: NURSE PRACTITIONER

## 2019-11-11 PROCEDURE — 99215 OFFICE O/P EST HI 40 MIN: CPT | Mod: PBBFAC,PO | Performed by: NURSE PRACTITIONER

## 2019-11-11 PROCEDURE — 99214 OFFICE O/P EST MOD 30 MIN: CPT | Mod: S$PBB,,, | Performed by: NURSE PRACTITIONER

## 2019-11-11 PROCEDURE — 99999 PR PBB SHADOW E&M-EST. PATIENT-LVL V: CPT | Mod: PBBFAC,,, | Performed by: NURSE PRACTITIONER

## 2019-11-11 RX ORDER — CYCLOBENZAPRINE HCL 10 MG
10 TABLET ORAL NIGHTLY
Qty: 15 TABLET | Refills: 0 | Status: SHIPPED | OUTPATIENT
Start: 2019-11-11 | End: 2019-11-26

## 2019-11-11 NOTE — PATIENT INSTRUCTIONS
PLAN:   Advise cool compresses  Advise avoid heat  Advise increase p.o. fluids-- water/juice & rest  Meds:  Flexeril/no refills  Tylenol  for fever, headache and body aches.  Advise follow up with PCP 3- 4 days for recheck  Advise go to ER if nausea, vomiting, fever, increased pain, or fail to improve with treatment.  AVS provided and reviewed with patient including supportive care, follow up, and red flag symptoms.   Patient verbalizes understanding and agrees with treatment plan. Discharged from Urgent Care in stable condition.

## 2019-11-11 NOTE — PROGRESS NOTES
CHIEF COMPLAINT/REASON FOR VISIT:  Low back and buttock pain    HISTORY OF PRESENT ILLNESS:  64-year-old male complains of left low back and buttock pain onset 6 days ago.  Patient admits change flat tire on big truck, pulled, felt strain in low back.  Admits tried  medication with heat with no relief.  Complains of left buttock pain radiating down left leg.  Complains left buttock and leg pain increases on movement and turning in bed.  Patient denies chest pain, dizziness, LOC, blurred vision, nausea, vomiting, diarrhea, shortness of breath, congestion, fever, cough, back pain and urinary discomfort.       Past Medical History:   Diagnosis Date    Arthritis     Circumcision complication     Cortical cataract of both eyes 10/19/2015    Glaucoma     Gout     Type 2 diabetes mellitus without complication, without long-term current use of insulin 2/4/2019          Social History     Socioeconomic History    Marital status: Single     Spouse name: Not on file    Number of children: Not on file    Years of education: Not on file    Highest education level: Not on file   Occupational History    Not on file   Social Needs    Financial resource strain: Not on file    Food insecurity:     Worry: Not on file     Inability: Not on file    Transportation needs:     Medical: Not on file     Non-medical: Not on file   Tobacco Use    Smoking status: Light Tobacco Smoker    Smokeless tobacco: Never Used    Tobacco comment: marijuana   Substance and Sexual Activity    Alcohol use: No    Drug use: Yes     Types: Marijuana    Sexual activity: Not on file   Lifestyle    Physical activity:     Days per week: Not on file     Minutes per session: Not on file    Stress: Not on file   Relationships    Social connections:     Talks on phone: Not on file     Gets together: Not on file     Attends Latter day service: Not on file     Active member of club or organization: Not on file     Attends meetings of clubs or  organizations: Not on file     Relationship status: Not on file   Other Topics Concern    Not on file   Social History Narrative    Not on file          Family History   Problem Relation Age of Onset    Glaucoma Sister     Glaucoma Maternal Grandmother        ROS:  GENERAL: No fever, chills, fatigability or weight loss.  SKIN: No rashes, itching or changes in color or texture of skin.  HEENT: No headaches or recent head trauma.  Denies ear pain, discharge or vertigo. No loss of smell, no epistaxis or postnasal drip. No hoarseness or change in voice.   NODES: No masses or lesions. Denies swollen glands.  CHEST: Denies cyanosis, wheezing, cough and sputum production.  CARDIOVASCULAR: Denies chest pain, PND, orthopnea or reduced exercise tolerance.  ABDOMEN: Appetite fine. No weight loss. Denies diarrhea, abdominal pain,   MUSCULOSKELETAL:  Left buttock and back pain.  NEUROLOGIC: No history of seizures, paralysis, alteration of gait or coordination.  PSYCHIATRIC: Denies mood swings, depression or suicidal thoughts.    PE:   APPEARANCE: Well nourished, well developed, in mild distress.   V/S: Reviewed.  SKIN: Normal skin turgor, no abrasions, lacerations, lesions.  CHEST:  No respiratory symptoms  CARDIOVASCULAR: Regular rate and rhythm   MUSCULOSKELETAL:  Lumbar region with full range of motion, left buttock and lower leg with minimal tenderness on movement  NEUROLOGIC: No sensory deficits. Gait & Posture: Normal gait and fine motion. No cerebellar signs.  MENTAL STATUS: Patient alert, oriented x 3 & conversant.    PLAN:   Advise cool compresses  Advise avoid heat  Advise increase p.o. fluids-- water/juice & rest  Meds:  Flexeril/no refills  Tylenol  for fever, headache and body aches.  Advise follow up with PCP 3- 4 days for recheck  Advise go to ER if nausea, vomiting, fever, increased pain, or fail to improve with treatment.  AVS provided and reviewed with patient including supportive care, follow up, and red  flag symptoms.   Patient verbalizes understanding and agrees with treatment plan. Discharged from Urgent Care in stable condition.      DIAGNOSIS:   Low back/sciatica  Type 2 diabetes mellitus without complication

## 2019-11-13 ENCOUNTER — TELEPHONE (OUTPATIENT)
Dept: FAMILY MEDICINE | Facility: CLINIC | Age: 64
End: 2019-11-13

## 2019-11-13 NOTE — TELEPHONE ENCOUNTER
Pt had to reschedule ppt due to you not being in clinic. Its his first visit and he has medicaid. When do you want him to come in?

## 2019-11-13 NOTE — TELEPHONE ENCOUNTER
----- Message from Renee Galo sent at 11/13/2019  2:07 PM CST -----  Contact: Patient  Type:  Patient Returning Call    Who Called:  Patient  Who Left Message for Patient:  na  Does the patient know what this is regarding?:  Rescheduling appointment  Best Call Back Number:  [phone] (home)    Additional Information:  Unable to schedule due to medicaid, thank you!

## 2019-11-14 NOTE — TELEPHONE ENCOUNTER
Patient has PCP already listed. Will need clarification further as he appears to already be established.

## 2019-12-19 DIAGNOSIS — H40.1131 PRIMARY OPEN ANGLE GLAUCOMA OF BOTH EYES, MILD STAGE: ICD-10-CM

## 2019-12-19 RX ORDER — LATANOPROST 50 UG/ML
SOLUTION/ DROPS OPHTHALMIC
Qty: 3 ML | Refills: 5 | Status: SHIPPED | OUTPATIENT
Start: 2019-12-19 | End: 2020-09-21 | Stop reason: SDUPTHER

## 2020-02-24 ENCOUNTER — OFFICE VISIT (OUTPATIENT)
Dept: OPHTHALMOLOGY | Facility: CLINIC | Age: 65
End: 2020-02-24
Payer: MEDICAID

## 2020-02-24 DIAGNOSIS — H02.834 DERMATOCHALASIS OF BOTH UPPER EYELIDS: ICD-10-CM

## 2020-02-24 DIAGNOSIS — H25.011 CORTICAL SENILE CATARACT OF RIGHT EYE: ICD-10-CM

## 2020-02-24 DIAGNOSIS — H02.831 DERMATOCHALASIS OF BOTH UPPER EYELIDS: ICD-10-CM

## 2020-02-24 DIAGNOSIS — H40.1131 PRIMARY OPEN ANGLE GLAUCOMA OF BOTH EYES, MILD STAGE: ICD-10-CM

## 2020-02-24 DIAGNOSIS — H25.012 CORTICAL SENILE CATARACT OF LEFT EYE: ICD-10-CM

## 2020-02-24 DIAGNOSIS — E11.36 DIABETIC CATARACT OF RIGHT EYE: ICD-10-CM

## 2020-02-24 DIAGNOSIS — E11.9 TYPE 2 DIABETES MELLITUS WITHOUT COMPLICATION, WITHOUT LONG-TERM CURRENT USE OF INSULIN: ICD-10-CM

## 2020-02-24 DIAGNOSIS — E11.36 DIABETIC CATARACT OF LEFT EYE: Primary | ICD-10-CM

## 2020-02-24 DIAGNOSIS — Z83.511 FAMILY HISTORY OF OPEN-ANGLE GLAUCOMA: ICD-10-CM

## 2020-02-24 PROCEDURE — 99999 PR PBB SHADOW E&M-EST. PATIENT-LVL I: CPT | Mod: PBBFAC,,, | Performed by: OPHTHALMOLOGY

## 2020-02-24 PROCEDURE — 92014 PR EYE EXAM, EST PATIENT,COMPREHESV: ICD-10-PCS | Mod: S$PBB,,, | Performed by: OPHTHALMOLOGY

## 2020-02-24 PROCEDURE — 99999 PR PBB SHADOW E&M-EST. PATIENT-LVL I: ICD-10-PCS | Mod: PBBFAC,,, | Performed by: OPHTHALMOLOGY

## 2020-02-24 PROCEDURE — 99211 OFF/OP EST MAY X REQ PHY/QHP: CPT | Mod: PBBFAC | Performed by: OPHTHALMOLOGY

## 2020-02-24 PROCEDURE — 92014 COMPRE OPH EXAM EST PT 1/>: CPT | Mod: S$PBB,,, | Performed by: OPHTHALMOLOGY

## 2020-02-24 RX ORDER — CYCLOBENZAPRINE HCL 10 MG
TABLET ORAL
COMMUNITY
End: 2021-06-22

## 2020-02-24 NOTE — PROGRESS NOTES
HPI     Glaucoma     Comments: 4M IOP check              Comments     The patient states his eyes are doing fine but his vision is blurred at   distance. Trouble driving, especially at night.     1. Glaucoma  Glaucoma Laser OD at [place] x early 2015 - no response per patient  2. +DM   3. Corneal FB removed OS 04/07/17  4. Retinal laser ([facility] 2017)  5. NSC OU      OU LATANOPROST QHS  OU DORZOLAMIDE/TIMOLOL BID  Refresh Gel drops OS          Last edited by Rony Friedman MD on 2/24/2020  9:32 AM. (History)            Assessment /Plan     For exam results, see Encounter Report.      ICD-10-CM ICD-9-CM    1. Diabetic cataract of left eye E11.36 250.50 Visually Significant Cataract OS > OD  Patient reports decreased vision consistent with the clinical amount of lenticular opacity, which reaches the level of visual significance and affects activities of daily living including reading and glare. Risks, benefits, and alternatives to cataract surgery were discussed.   The pt expressed a desire to proceed with surgery with the potential for some reasonable degree of visual improvement.    Discussed IOL options and refractive outcomes for this patient.    Phaco left eye with goniotomy,   Block  monofocal IOL.  Will aim for distance      Discussed with patient treatment options for glaucoma in conjunction with cataract surgery. Patient is currently treating glaucoma with topical medications and reports significant complaints regarding the tolerability of the medications with respect to cost and irritation. Specifically, the patient complains of redness, irritation, chronic discomfort as well as a financial burden associated with the use of glaucoma medications. Discussed treatment options including ECP, filtering procedures, iStent, canaloplasty, Goniotomy or the continued use of glaucoma medications. Patient desires the undergo goniotomy in conjunction with cataract surgery in order to reduce dependence of glaucoma  medications.               366.41    2. Diabetic cataract of right eye E11.36 250.50 Will need surgery also     366.41    3. Cortical senile cataract of right eye H25.011 366.15 As above   4. Primary open angle glaucoma of both eyes, mild stage H40.1131 365.11      365.71    5. Type 2 diabetes mellitus without complication, without long-term current use of insulin E11.9 250.00 Diabetes with no diabetic retinopathy on dilated exam.   Reviewed diabetic eye precautions including excellent blood sugar control, and importance of regular follow up.          6. Family history of open-angle glaucoma Z83.511 V19.11    7. Dermatochalasis of both upper eyelids H02.831 374.87 follow    H02.834     8. Cortical senile cataract of left eye H25.012 366.15 As above - see #1       Return to clinic for Ascan and to schedule phaco with goniotomy OS  Will send durezol , keto and place referral next visit

## 2020-02-27 ENCOUNTER — TELEPHONE (OUTPATIENT)
Dept: OPHTHALMOLOGY | Facility: CLINIC | Age: 65
End: 2020-02-27

## 2020-02-27 NOTE — TELEPHONE ENCOUNTER
----- Message from Teresa Lim sent at 2/27/2020  2:54 PM CST -----  Contact: pt  Requesting call back regarding status of appt for pt to have cataract surgery. Please call back at [phone].

## 2020-02-27 NOTE — TELEPHONE ENCOUNTER
I SPOKE TO PT AND BOOKED HIM FOR 3/3 AT 2:30 FOR ABIODUN, PRE-OP  THEN HELD HIS SURGERY DATE FOR 3/12/2020  I SENT AN EMAIL TO ILDEFONSO AND CHRISTEL TO HOLD THE DATE

## 2020-03-03 ENCOUNTER — OFFICE VISIT (OUTPATIENT)
Dept: OPHTHALMOLOGY | Facility: CLINIC | Age: 65
End: 2020-03-03
Payer: MEDICAID

## 2020-03-03 DIAGNOSIS — H25.011 CORTICAL SENILE CATARACT OF RIGHT EYE: ICD-10-CM

## 2020-03-03 DIAGNOSIS — H40.1131 PRIMARY OPEN ANGLE GLAUCOMA OF BOTH EYES, MILD STAGE: ICD-10-CM

## 2020-03-03 DIAGNOSIS — E11.9 TYPE 2 DIABETES MELLITUS WITHOUT COMPLICATION, WITHOUT LONG-TERM CURRENT USE OF INSULIN: ICD-10-CM

## 2020-03-03 DIAGNOSIS — H25.012 CORTICAL SENILE CATARACT OF LEFT EYE: ICD-10-CM

## 2020-03-03 DIAGNOSIS — E11.36 DIABETIC CATARACT OF LEFT EYE: Primary | ICD-10-CM

## 2020-03-03 DIAGNOSIS — E11.36 DIABETIC CATARACT OF RIGHT EYE: ICD-10-CM

## 2020-03-03 PROCEDURE — 99212 OFFICE O/P EST SF 10 MIN: CPT | Mod: PBBFAC | Performed by: OPHTHALMOLOGY

## 2020-03-03 PROCEDURE — 99499 UNLISTED E&M SERVICE: CPT | Mod: S$PBB,,, | Performed by: OPHTHALMOLOGY

## 2020-03-03 PROCEDURE — 99499 NO LOS: ICD-10-PCS | Mod: S$PBB,,, | Performed by: OPHTHALMOLOGY

## 2020-03-03 PROCEDURE — 99999 PR PBB SHADOW E&M-EST. PATIENT-LVL II: ICD-10-PCS | Mod: PBBFAC,,, | Performed by: OPHTHALMOLOGY

## 2020-03-03 PROCEDURE — 99999 PR PBB SHADOW E&M-EST. PATIENT-LVL II: CPT | Mod: PBBFAC,,, | Performed by: OPHTHALMOLOGY

## 2020-03-03 RX ORDER — DIFLUPREDNATE OPHTHALMIC 0.5 MG/ML
1 EMULSION OPHTHALMIC 4 TIMES DAILY
Qty: 1 BOTTLE | Refills: 1 | Status: SHIPPED | OUTPATIENT
Start: 2020-03-03 | End: 2020-04-02

## 2020-03-03 RX ORDER — KETOROLAC TROMETHAMINE 5 MG/ML
1 SOLUTION OPHTHALMIC 4 TIMES DAILY
Qty: 1 BOTTLE | Refills: 3 | Status: SHIPPED | OUTPATIENT
Start: 2020-03-03 | End: 2020-03-08

## 2020-03-03 NOTE — PROGRESS NOTES
Assessment /Plan     For exam results, see Encounter Report.      ICD-10-CM ICD-9-CM    1. Diabetic cataract of left eye E11.36 250.50 Visually Significant Cataract OS  Patient reports decreased vision consistent with the clinical amount of lenticular opacity, which reaches the level of visual significance and affects activities of daily living including reading and glare. Risks, benefits, and alternatives to cataract surgery were discussed.   The pt expressed a desire to proceed with surgery with the potential for some reasonable degree of visual improvement.    Discussed IOL options and refractive outcomes for this patient.    Phaco left eye with goniotomy,   Block  monofocal IOL.  Will aim for distance  Referral to [facility] for Ophthalmic surgery  Prescriptions sent for preoperative medications  Durezol and ketorolac  Explained that patient may need glasses after surgery.  Discussed that vision may be limited by DM       19.5         366.41    2. Diabetic cataract of right eye E11.36 250.50 Will do OS first      366.41    3. Primary open angle glaucoma of both eyes, mild stage H40.1131 365.11 Discussed with patient treatment options for glaucoma in conjunction with cataract surgery. Patient is currently treating glaucoma with topical medications and reports significant complaints regarding the tolerability of the medications with respect to cost and irritation. Specifically, the patient complains of redness, irritation, chronic discomfort as well as a financial burden associated with the use of glaucoma medications. Discussed treatment options including ECP, filtering procedures, iStent, canaloplasty, Goniotomy or the continued use of glaucoma medications. Patient desires the undergo goniotomy OS in conjunction with cataract surgery in order to reduce dependence of glaucoma medications.           365.71    4. Type 2 diabetes mellitus without complication, without long-term current use  of insulin E11.9 250.00        Return to clinic for phaco OS with goniotomy

## 2020-04-02 DIAGNOSIS — H40.1131 PRIMARY OPEN ANGLE GLAUCOMA OF BOTH EYES, MILD STAGE: ICD-10-CM

## 2020-04-02 RX ORDER — DORZOLAMIDE HYDROCHLORIDE AND TIMOLOL MALEATE 20; 5 MG/ML; MG/ML
SOLUTION/ DROPS OPHTHALMIC
Qty: 10 ML | Refills: 5 | Status: SHIPPED | OUTPATIENT
Start: 2020-04-02 | End: 2020-09-21 | Stop reason: SDUPTHER

## 2020-04-27 ENCOUNTER — TELEPHONE (OUTPATIENT)
Dept: OPHTHALMOLOGY | Facility: CLINIC | Age: 65
End: 2020-04-27

## 2020-04-27 NOTE — TELEPHONE ENCOUNTER
Contacted patient to advise that surgeries will be resuming next week and asked if he'd like to be put on schedule.  Patient wants to proceed with surgery.  Patient was asked screening questions prepared by [facility] and answered no to all.  Also, reviewed additional info provided by [facility]. Patient has been to [facility] in the past 14.  I contacted Rosario and advised.  Rosario stated that patient was OK to rescheduled for 5/7, since he's not exhibiting symptoms, and  Has not been in contact with anyone who's been exhibiting symptoms or been diagnosed with COVID 19.

## 2020-04-29 ENCOUNTER — TELEPHONE (OUTPATIENT)
Dept: OPHTHALMOLOGY | Facility: CLINIC | Age: 65
End: 2020-04-29

## 2020-04-29 NOTE — TELEPHONE ENCOUNTER
Spoke with mr. noble and he just wanted to know if his surgery is still on go or will it be canceled.  I told him that Ms. Parks will give him a call back and touch base with him about his surgery. kf

## 2020-05-04 ENCOUNTER — TELEPHONE (OUTPATIENT)
Dept: OPHTHALMOLOGY | Facility: CLINIC | Age: 65
End: 2020-05-04

## 2020-05-04 DIAGNOSIS — E11.36 DIABETIC CATARACT OF LEFT EYE: Primary | ICD-10-CM

## 2020-05-05 ENCOUNTER — LAB VISIT (OUTPATIENT)
Dept: OTOLARYNGOLOGY | Facility: CLINIC | Age: 65
End: 2020-05-05
Payer: MEDICAID

## 2020-05-05 DIAGNOSIS — E11.36 DIABETIC CATARACT OF LEFT EYE: ICD-10-CM

## 2020-05-05 PROCEDURE — U0002 COVID-19 LAB TEST NON-CDC: HCPCS

## 2020-05-06 LAB — SARS-COV-2 RNA RESP QL NAA+PROBE: NOT DETECTED

## 2020-05-07 ENCOUNTER — OUTSIDE PLACE OF SERVICE (OUTPATIENT)
Dept: ADMINISTRATIVE | Facility: OTHER | Age: 65
End: 2020-05-07
Payer: MEDICAID

## 2020-05-07 PROCEDURE — 66984 PR REMOVAL, CATARACT, W/INSRT INTRAOC LENS, W/O ENDO CYCLO: ICD-10-PCS | Mod: 51,LT,, | Performed by: OPHTHALMOLOGY

## 2020-05-07 PROCEDURE — 65820 GONIOTOMY: CPT | Mod: LT,,, | Performed by: OPHTHALMOLOGY

## 2020-05-07 PROCEDURE — 65820 PR RELIEVE INNER EYE PRESSURE: ICD-10-PCS | Mod: LT,,, | Performed by: OPHTHALMOLOGY

## 2020-05-07 PROCEDURE — 66984 XCAPSL CTRC RMVL W/O ECP: CPT | Mod: 51,LT,, | Performed by: OPHTHALMOLOGY

## 2020-05-08 ENCOUNTER — OFFICE VISIT (OUTPATIENT)
Dept: OPHTHALMOLOGY | Facility: CLINIC | Age: 65
End: 2020-05-08
Payer: MEDICAID

## 2020-05-08 DIAGNOSIS — Z98.890 POST-OPERATIVE STATE: Primary | ICD-10-CM

## 2020-05-08 DIAGNOSIS — Z98.42 CATARACT EXTRACTION STATUS, LEFT: ICD-10-CM

## 2020-05-08 PROCEDURE — 99999 PR PBB SHADOW E&M-EST. PATIENT-LVL II: CPT | Mod: PBBFAC,,, | Performed by: OPHTHALMOLOGY

## 2020-05-08 PROCEDURE — 99999 PR PBB SHADOW E&M-EST. PATIENT-LVL II: ICD-10-PCS | Mod: PBBFAC,,, | Performed by: OPHTHALMOLOGY

## 2020-05-08 PROCEDURE — 99212 OFFICE O/P EST SF 10 MIN: CPT | Mod: PBBFAC | Performed by: OPHTHALMOLOGY

## 2020-05-08 PROCEDURE — 99024 PR POST-OP FOLLOW-UP VISIT: ICD-10-PCS | Mod: ,,, | Performed by: OPHTHALMOLOGY

## 2020-05-08 PROCEDURE — 99024 POSTOP FOLLOW-UP VISIT: CPT | Mod: ,,, | Performed by: OPHTHALMOLOGY

## 2020-05-18 ENCOUNTER — OFFICE VISIT (OUTPATIENT)
Dept: OPHTHALMOLOGY | Facility: CLINIC | Age: 65
End: 2020-05-18
Payer: MEDICAID

## 2020-05-18 DIAGNOSIS — Z98.890 POST-OPERATIVE STATE: Primary | ICD-10-CM

## 2020-05-18 PROCEDURE — 99024 POSTOP FOLLOW-UP VISIT: CPT | Mod: ,,, | Performed by: OPHTHALMOLOGY

## 2020-05-18 PROCEDURE — 99999 PR PBB SHADOW E&M-EST. PATIENT-LVL I: ICD-10-PCS | Mod: PBBFAC,,, | Performed by: OPHTHALMOLOGY

## 2020-05-18 PROCEDURE — 99211 OFF/OP EST MAY X REQ PHY/QHP: CPT | Mod: PBBFAC | Performed by: OPHTHALMOLOGY

## 2020-05-18 PROCEDURE — 99999 PR PBB SHADOW E&M-EST. PATIENT-LVL I: CPT | Mod: PBBFAC,,, | Performed by: OPHTHALMOLOGY

## 2020-05-18 PROCEDURE — 99024 PR POST-OP FOLLOW-UP VISIT: ICD-10-PCS | Mod: ,,, | Performed by: OPHTHALMOLOGY

## 2020-05-18 NOTE — PROGRESS NOTES
HPI     Post-op Evaluation     Comments: S/P Phaco with IOL/ Goniotomy OS  (05/07/20)              Comments     Patient feels OS is doing well, no pain or irritation and states using   all drops as directed.      1. Glaucoma  Glaucoma Laser OD at [place] x early 2015 - no response per patient  2. +DM   3. Corneal FB removed OS 04/07/17  4. Retinal laser ([facility] 2017)  5. PCIOL OS W/ Goniotomy 5/7/2020 SN60WF +19.5 (cde 7.60)/istent ?        OD LATANOPROST QHS  OU DORZOLAMIDE/TIMOLOL BID  Refresh Gel drops OS  OS DUREZOL QID, KETOROLAC          Last edited by Janell Lopez, Patient Care Assistant on 5/18/2020  8:12   AM. (History)            Assessment /Plan     For exam results, see Encounter Report.      ICD-10-CM ICD-9-CM    1. Post-operative state Z98.890 V45.89        S/p phaco/ goniotomy left eye - doing well         OD LATANOPROST QHS  OU DORZOLAMIDE/TIMOLOL BID  Refresh Gel drops OS  OS taper DUREZOL TID x 5/25, BID x 6/5, then QD x 1 week   Stop KETOROLAC

## 2020-06-22 ENCOUNTER — OFFICE VISIT (OUTPATIENT)
Dept: OPHTHALMOLOGY | Facility: CLINIC | Age: 65
End: 2020-06-22
Payer: MEDICAID

## 2020-06-22 DIAGNOSIS — Z98.42 CATARACT EXTRACTION STATUS, LEFT: Primary | ICD-10-CM

## 2020-06-22 PROCEDURE — 99999 PR PBB SHADOW E&M-EST. PATIENT-LVL I: ICD-10-PCS | Mod: PBBFAC,,, | Performed by: OPHTHALMOLOGY

## 2020-06-22 PROCEDURE — 99024 PR POST-OP FOLLOW-UP VISIT: ICD-10-PCS | Mod: ,,, | Performed by: OPHTHALMOLOGY

## 2020-06-22 PROCEDURE — 99024 POSTOP FOLLOW-UP VISIT: CPT | Mod: ,,, | Performed by: OPHTHALMOLOGY

## 2020-06-22 PROCEDURE — 99999 PR PBB SHADOW E&M-EST. PATIENT-LVL I: CPT | Mod: PBBFAC,,, | Performed by: OPHTHALMOLOGY

## 2020-06-22 PROCEDURE — 99211 OFF/OP EST MAY X REQ PHY/QHP: CPT | Mod: PBBFAC | Performed by: OPHTHALMOLOGY

## 2020-06-22 RX ORDER — DUREZOL 0.5 MG/ML
1 EMULSION OPHTHALMIC 2 TIMES DAILY
Qty: 1 BOTTLE | Refills: 1 | Status: SHIPPED | OUTPATIENT
Start: 2020-06-22 | End: 2020-07-22

## 2020-06-22 NOTE — PROGRESS NOTES
HPI     C.c left eye still blurry and trouble road signs until I'm on it- feels   like outside of my eye socket is still swollen and does not  feel the same   as the Right eye     1. Glaucoma  Glaucoma Laser OD at [facility] early 2015 - no response per patient  2. +DM   3. Corneal FB removed OS 04/07/17  [facility]  5. PCIOL OS W/ Goniotomy 5/7/2020 SN60WF +19.5 (cde 7.60)/istent ?      OD LATANOPROST QHS  OU DORZOLAMIDE/TIMOLOL BID  Refresh Gel drops OS  OS DUREZOL QD     Last edited by Rony Friedman MD on 6/22/2020  8:43 AM. (History)            Assessment /Plan     For exam results, see Encounter Report.      ICD-10-CM ICD-9-CM    1. Cataract extraction status, left  Z98.42 V45.61        Doing well but slight more iritis than expected  Increase Durezol to bid OS  OD LATANOPROST QHS  OU DORZOLAMIDE/TIMOLOL BID  Refresh Gel drops OS  Return to clinic 1 month

## 2020-08-04 ENCOUNTER — OFFICE VISIT (OUTPATIENT)
Dept: OPHTHALMOLOGY | Facility: CLINIC | Age: 65
End: 2020-08-04
Payer: MEDICAID

## 2020-08-04 DIAGNOSIS — Z98.42 CATARACT EXTRACTION STATUS, LEFT: ICD-10-CM

## 2020-08-04 DIAGNOSIS — Z98.890 POST-OPERATIVE STATE: Primary | ICD-10-CM

## 2020-08-04 DIAGNOSIS — H40.1131 PRIMARY OPEN ANGLE GLAUCOMA OF BOTH EYES, MILD STAGE: ICD-10-CM

## 2020-08-04 PROCEDURE — 99211 OFF/OP EST MAY X REQ PHY/QHP: CPT | Mod: PBBFAC | Performed by: OPHTHALMOLOGY

## 2020-08-04 PROCEDURE — 99999 PR PBB SHADOW E&M-EST. PATIENT-LVL I: CPT | Mod: PBBFAC,,, | Performed by: OPHTHALMOLOGY

## 2020-08-04 PROCEDURE — 99999 PR PBB SHADOW E&M-EST. PATIENT-LVL I: ICD-10-PCS | Mod: PBBFAC,,, | Performed by: OPHTHALMOLOGY

## 2020-08-04 PROCEDURE — 99024 PR POST-OP FOLLOW-UP VISIT: ICD-10-PCS | Mod: ,,, | Performed by: OPHTHALMOLOGY

## 2020-08-04 PROCEDURE — 99024 POSTOP FOLLOW-UP VISIT: CPT | Mod: ,,, | Performed by: OPHTHALMOLOGY

## 2020-08-04 NOTE — PROGRESS NOTES
HPI     Post-op Evaluation     Comments: PCIOL W/ Gonio OS 5/7/20              Comments     The patient states his left eye is doing well and he denies any ocular   pain at this time.    1. Glaucoma  Glaucoma Laser OD at [place] x early 2015 - no response per patient  2. +DM   3. Corneal FB removed OS 04/07/17  4. Retinal laser ([facility] 2017)  5. PCIOL OS W/ Goniotomy 5/7/2020 SN60WF +19.5 (cde 7.60)/istent ?      OD LATANOPROST QHS  OU DORZOLAMIDE/TIMOLOL BID  Refresh Gel drops OS  OS DUREZOL BID          Last edited by Elena Vance on 8/4/2020  2:53 PM. (History)            Assessment /Plan     For exam results, see Encounter Report.      ICD-10-CM ICD-9-CM    1. Post-operative state  Z98.890 V45.89 S/p phaco/ gonio os    2. Cataract extraction status, left  Z98.42 V45.61    3. Primary open angle glaucoma of both eyes, mild stage  H40.1131 365.11      365.71        Still iritis still present- continue  Durezol     OD LATANOPROST QHS  OU DORZOLAMIDE/TIMOLOL BID  Refresh Gel drops OS  OS DUREZOL BID     RETURN TO CLINIC 6 weeks and will review goct scan then

## 2020-09-21 ENCOUNTER — OFFICE VISIT (OUTPATIENT)
Dept: OPHTHALMOLOGY | Facility: CLINIC | Age: 65
End: 2020-09-21
Payer: MEDICAID

## 2020-09-21 DIAGNOSIS — H25.011 CORTICAL SENILE CATARACT OF RIGHT EYE: ICD-10-CM

## 2020-09-21 DIAGNOSIS — Z98.42 CATARACT EXTRACTION STATUS, LEFT: Primary | ICD-10-CM

## 2020-09-21 DIAGNOSIS — H40.1131 PRIMARY OPEN ANGLE GLAUCOMA OF BOTH EYES, MILD STAGE: ICD-10-CM

## 2020-09-21 PROCEDURE — 99213 OFFICE O/P EST LOW 20 MIN: CPT | Mod: PBBFAC | Performed by: OPHTHALMOLOGY

## 2020-09-21 PROCEDURE — 99999 PR PBB SHADOW E&M-EST. PATIENT-LVL III: CPT | Mod: PBBFAC,,, | Performed by: OPHTHALMOLOGY

## 2020-09-21 PROCEDURE — 99024 PR POST-OP FOLLOW-UP VISIT: ICD-10-PCS | Mod: ,,, | Performed by: OPHTHALMOLOGY

## 2020-09-21 PROCEDURE — 99999 PR PBB SHADOW E&M-EST. PATIENT-LVL III: ICD-10-PCS | Mod: PBBFAC,,, | Performed by: OPHTHALMOLOGY

## 2020-09-21 PROCEDURE — 99024 POSTOP FOLLOW-UP VISIT: CPT | Mod: ,,, | Performed by: OPHTHALMOLOGY

## 2020-09-21 RX ORDER — LATANOPROST 50 UG/ML
SOLUTION/ DROPS OPHTHALMIC
Qty: 3 ML | Refills: 5 | Status: SHIPPED | OUTPATIENT
Start: 2020-09-21 | End: 2021-06-22

## 2020-09-21 RX ORDER — HYDROCODONE BITARTRATE AND ACETAMINOPHEN 10; 325 MG/1; MG/1
TABLET ORAL
COMMUNITY
Start: 2020-08-25 | End: 2021-06-22

## 2020-09-21 NOTE — PROGRESS NOTES
HPI     Iritis     Comments: R/C OS              Comments     The patient states his left eye is feeling fine.    1. Glaucoma  Glaucoma Laser OD at [place] x early 2015 - no response per patient  2. +DM   3. Corneal FB removed OS 04/07/17  [facility]. [facility]  5. PCIOL OS W/ Goniotomy 5/7/2020 SN60WF +19.5 (cde 7.60)/istent ?      OD LATANOPROST QHS  OU DORZOLAMIDE/TIMOLOL BID  Refresh Gel drops OS  OS DUREZOL BID          Last edited by Elena Vance on 9/21/2020  8:09 AM. (History)            Assessment /Plan     For exam results, see Encounter Report.      ICD-10-CM ICD-9-CM    1. Cataract extraction status, left  Z98.42 V45.61 Doing well with reduced iritis   2. Primary open angle glaucoma of both eyes, mild stage  H40.1131 365.11 latanoprost 0.005 % ophthalmic solution     365.71    3. Cortical senile cataract of right eye  H25.011 366.15 Will wait until next summer       OD LATANOPROST QHS  OU DORZOLAMIDE/TIMOLOL BID  Refresh Gel drops OS  OS DUREZOL reduce to once per day  Return to clinic 6 weeks

## 2020-11-16 ENCOUNTER — OFFICE VISIT (OUTPATIENT)
Dept: OPHTHALMOLOGY | Facility: CLINIC | Age: 65
End: 2020-11-16
Payer: MEDICAID

## 2020-11-16 DIAGNOSIS — H02.403 PTOSIS OF BOTH EYELIDS: ICD-10-CM

## 2020-11-16 DIAGNOSIS — Z98.42 CATARACT EXTRACTION STATUS, LEFT: Primary | ICD-10-CM

## 2020-11-16 DIAGNOSIS — H40.1131 PRIMARY OPEN ANGLE GLAUCOMA OF BOTH EYES, MILD STAGE: ICD-10-CM

## 2020-11-16 DIAGNOSIS — E11.36 DIABETIC CATARACT OF RIGHT EYE: ICD-10-CM

## 2020-11-16 PROCEDURE — 92136 OPHTHALMIC BIOMETRY: CPT | Mod: PBBFAC,RT | Performed by: OPHTHALMOLOGY

## 2020-11-16 PROCEDURE — 99213 OFFICE O/P EST LOW 20 MIN: CPT | Mod: PBBFAC,25 | Performed by: OPHTHALMOLOGY

## 2020-11-16 PROCEDURE — 92136 IOL MASTER - OD - RIGHT EYE: ICD-10-PCS | Mod: 26,S$PBB,RT, | Performed by: OPHTHALMOLOGY

## 2020-11-16 PROCEDURE — 99024 PR POST-OP FOLLOW-UP VISIT: ICD-10-PCS | Mod: ,,, | Performed by: OPHTHALMOLOGY

## 2020-11-16 PROCEDURE — 99024 POSTOP FOLLOW-UP VISIT: CPT | Mod: ,,, | Performed by: OPHTHALMOLOGY

## 2020-11-16 PROCEDURE — 99999 PR PBB SHADOW E&M-EST. PATIENT-LVL III: ICD-10-PCS | Mod: PBBFAC,,, | Performed by: OPHTHALMOLOGY

## 2020-11-16 PROCEDURE — 99999 PR PBB SHADOW E&M-EST. PATIENT-LVL III: CPT | Mod: PBBFAC,,, | Performed by: OPHTHALMOLOGY

## 2020-11-16 RX ORDER — DUREZOL 0.5 MG/ML
1 EMULSION OPHTHALMIC 4 TIMES DAILY
Qty: 1 BOTTLE | Refills: 0 | Status: SHIPPED | OUTPATIENT
Start: 2020-11-16 | End: 2020-12-16

## 2020-11-16 RX ORDER — KETOROLAC TROMETHAMINE 5 MG/ML
1 SOLUTION OPHTHALMIC 4 TIMES DAILY
Qty: 5 ML | Refills: 0 | Status: SHIPPED | OUTPATIENT
Start: 2020-11-16 | End: 2021-06-22

## 2020-11-16 RX ORDER — ASPIRIN 325 MG
TABLET, DELAYED RELEASE (ENTERIC COATED) ORAL
COMMUNITY
End: 2022-10-24

## 2020-11-16 NOTE — PROGRESS NOTES
HPI     Glaucoma     Comments: 6 wk COAG              Comments     States that his vision is still blurry in his OS states that he recently   ran out of his steroid gtts and that he has been compliant with his other   gtts as directed. Complains of a film when looking out of his OS.    1. Glaucoma  Glaucoma Laser OD at [facility] early 2015 - no response per patient  2. +DM   3. Corneal FB removed OS 04/07/17  4. [facility]  5. PCIOL OS W/ Goniotomy 5/7/2020 SN60WF +19.5 (cde 7.60)/istent ?      OD LATANOPROST QHS  OU DORZOLAMIDE/TIMOLOL BID  Refresh Gel drops OS  OS DUREZOL BID          Last edited by Maryana Roman on 11/16/2020  8:40 AM. (History)            Assessment /Plan     For exam results, see Encounter Report.      ICD-10-CM ICD-9-CM    1. Cataract extraction status, left  Z98.42 V45.61 Doing well   2. Primary open angle glaucoma of both eyes, mild stage  H40.1131 365.11 iop better controlled post phaco/goniotomy     365.71    3. Diabetic cataract of right eye  E11.36 250.50 Patient reports decreased vision in the fellow eye consistent with the clinical amount of lenticular opacity, which reaches the level of visual significance and affects activities of daily living including reading and glare. Risks, benefits, and alternatives to cataract surgery were discussed and pt desired to schedule cataract surgery. Pt was consented and the biometry and lens options were reviewed.    Phaco right with goniotomy    +19.5 WF  Block   Requests a monofocal  IOL.  Will aim for distance  Patient given prescriptions for durezol and keto  Explained that patient may need glasses after surgery.  Discussed that vision may be limited by glaucoma    Stop asa x 10-12 days befroe sx   Discussed with patient treatment options for glaucoma in conjunction with cataract surgery. Patient is currently treating glaucoma with topical medications and reports significant complaints regarding the tolerability of  the medications with respect to cost and irritation. Specifically, the patient complains of redness, irritation, chronic discomfort as well as a financial burden associated with the use of glaucoma medications. Discussed treatment options including ECP, filtering procedures, iStent, canaloplasty, Goniotomy or the continued use of glaucoma medications. Patient desires the undergo goniotomy in conjunction with cataract surgery in order to reduce dependence of glaucoma medications.           366.41    4. Ptosis of both eyelids  H02.403 374.30 Will likely need surgery at some point.        OD LATANOPROST QHS  OU DORZOLAMIDE/TIMOLOL BID  Refresh Gel drops OS    Doing well without durezol

## 2021-01-07 ENCOUNTER — OUTSIDE PLACE OF SERVICE (OUTPATIENT)
Dept: ADMINISTRATIVE | Facility: OTHER | Age: 66
End: 2021-01-07
Payer: MEDICAID

## 2021-01-07 PROCEDURE — 66984 PR REMOVAL, CATARACT, W/INSRT INTRAOC LENS, W/O ENDO CYCLO: ICD-10-PCS | Mod: 51,RT,, | Performed by: OPHTHALMOLOGY

## 2021-01-07 PROCEDURE — 65820 GONIOTOMY: CPT | Mod: RT,,, | Performed by: OPHTHALMOLOGY

## 2021-01-07 PROCEDURE — 66984 XCAPSL CTRC RMVL W/O ECP: CPT | Mod: 51,RT,, | Performed by: OPHTHALMOLOGY

## 2021-01-07 PROCEDURE — 65820 PR RELIEVE INNER EYE PRESSURE: ICD-10-PCS | Mod: RT,,, | Performed by: OPHTHALMOLOGY

## 2021-01-08 ENCOUNTER — OFFICE VISIT (OUTPATIENT)
Dept: OPHTHALMOLOGY | Facility: CLINIC | Age: 66
End: 2021-01-08
Payer: MEDICAID

## 2021-01-08 DIAGNOSIS — Z98.41 CATARACT EXTRACTION STATUS OF RIGHT EYE: ICD-10-CM

## 2021-01-08 DIAGNOSIS — H40.1131 PRIMARY OPEN ANGLE GLAUCOMA OF BOTH EYES, MILD STAGE: ICD-10-CM

## 2021-01-08 DIAGNOSIS — Z98.890 POST-OPERATIVE STATE: Primary | ICD-10-CM

## 2021-01-08 PROCEDURE — 99999 PR PBB SHADOW E&M-EST. PATIENT-LVL III: CPT | Mod: PBBFAC,,, | Performed by: OPHTHALMOLOGY

## 2021-01-08 PROCEDURE — 99999 PR PBB SHADOW E&M-EST. PATIENT-LVL III: ICD-10-PCS | Mod: PBBFAC,,, | Performed by: OPHTHALMOLOGY

## 2021-01-08 PROCEDURE — 99024 PR POST-OP FOLLOW-UP VISIT: ICD-10-PCS | Mod: ,,, | Performed by: OPHTHALMOLOGY

## 2021-01-08 PROCEDURE — 99213 OFFICE O/P EST LOW 20 MIN: CPT | Mod: PBBFAC | Performed by: OPHTHALMOLOGY

## 2021-01-08 PROCEDURE — 99024 POSTOP FOLLOW-UP VISIT: CPT | Mod: ,,, | Performed by: OPHTHALMOLOGY

## 2021-01-15 ENCOUNTER — OFFICE VISIT (OUTPATIENT)
Dept: OPHTHALMOLOGY | Facility: CLINIC | Age: 66
End: 2021-01-15
Payer: MEDICAID

## 2021-01-15 DIAGNOSIS — Z98.41 CATARACT EXTRACTION STATUS, RIGHT: ICD-10-CM

## 2021-01-15 DIAGNOSIS — H40.1131 PRIMARY OPEN ANGLE GLAUCOMA OF BOTH EYES, MILD STAGE: ICD-10-CM

## 2021-01-15 DIAGNOSIS — Z98.890 POST-OPERATIVE STATE: Primary | ICD-10-CM

## 2021-01-15 PROCEDURE — 99999 PR PBB SHADOW E&M-EST. PATIENT-LVL III: ICD-10-PCS | Mod: PBBFAC,,, | Performed by: OPHTHALMOLOGY

## 2021-01-15 PROCEDURE — 99213 OFFICE O/P EST LOW 20 MIN: CPT | Mod: PBBFAC | Performed by: OPHTHALMOLOGY

## 2021-01-15 PROCEDURE — 99024 POSTOP FOLLOW-UP VISIT: CPT | Mod: ,,, | Performed by: OPHTHALMOLOGY

## 2021-01-15 PROCEDURE — 99024 PR POST-OP FOLLOW-UP VISIT: ICD-10-PCS | Mod: ,,, | Performed by: OPHTHALMOLOGY

## 2021-01-15 PROCEDURE — 99999 PR PBB SHADOW E&M-EST. PATIENT-LVL III: CPT | Mod: PBBFAC,,, | Performed by: OPHTHALMOLOGY

## 2021-03-01 ENCOUNTER — OFFICE VISIT (OUTPATIENT)
Dept: OPHTHALMOLOGY | Facility: CLINIC | Age: 66
End: 2021-03-01
Payer: MEDICAID

## 2021-03-01 DIAGNOSIS — Z98.41 CATARACT EXTRACTION STATUS OF RIGHT EYE: ICD-10-CM

## 2021-03-01 DIAGNOSIS — Z98.42 CATARACT EXTRACTION STATUS, LEFT: ICD-10-CM

## 2021-03-01 DIAGNOSIS — H40.1131 PRIMARY OPEN ANGLE GLAUCOMA OF BOTH EYES, MILD STAGE: ICD-10-CM

## 2021-03-01 DIAGNOSIS — Z98.890 POST-OPERATIVE STATE: Primary | ICD-10-CM

## 2021-03-01 PROCEDURE — 99213 OFFICE O/P EST LOW 20 MIN: CPT | Mod: PBBFAC | Performed by: OPHTHALMOLOGY

## 2021-03-01 PROCEDURE — 99024 POSTOP FOLLOW-UP VISIT: CPT | Mod: ,,, | Performed by: OPHTHALMOLOGY

## 2021-03-01 PROCEDURE — 99999 PR PBB SHADOW E&M-EST. PATIENT-LVL III: CPT | Mod: PBBFAC,,, | Performed by: OPHTHALMOLOGY

## 2021-03-01 PROCEDURE — 99024 PR POST-OP FOLLOW-UP VISIT: ICD-10-PCS | Mod: ,,, | Performed by: OPHTHALMOLOGY

## 2021-03-01 PROCEDURE — 99999 PR PBB SHADOW E&M-EST. PATIENT-LVL III: ICD-10-PCS | Mod: PBBFAC,,, | Performed by: OPHTHALMOLOGY

## 2021-06-22 ENCOUNTER — APPOINTMENT (OUTPATIENT)
Dept: OPHTHALMOLOGY | Facility: CLINIC | Age: 66
End: 2021-06-22
Payer: MEDICAID

## 2021-06-22 ENCOUNTER — OFFICE VISIT (OUTPATIENT)
Dept: OPHTHALMOLOGY | Facility: CLINIC | Age: 66
End: 2021-06-22
Payer: MEDICARE

## 2021-06-22 DIAGNOSIS — Z98.42 CATARACT EXTRACTION STATUS, LEFT: ICD-10-CM

## 2021-06-22 DIAGNOSIS — Z98.41 CATARACT EXTRACTION STATUS, RIGHT: ICD-10-CM

## 2021-06-22 DIAGNOSIS — H02.403 PTOSIS OF BOTH EYELIDS: ICD-10-CM

## 2021-06-22 DIAGNOSIS — E11.9 TYPE 2 DIABETES MELLITUS WITHOUT COMPLICATION, WITHOUT LONG-TERM CURRENT USE OF INSULIN: ICD-10-CM

## 2021-06-22 DIAGNOSIS — H40.1131 PRIMARY OPEN ANGLE GLAUCOMA OF BOTH EYES, MILD STAGE: Primary | ICD-10-CM

## 2021-06-22 PROCEDURE — 99213 OFFICE O/P EST LOW 20 MIN: CPT | Mod: S$GLB,,, | Performed by: OPHTHALMOLOGY

## 2021-06-22 PROCEDURE — 92083 HUMPHREY VISUAL FIELD - OU - BOTH EYES: ICD-10-PCS | Mod: S$GLB,,, | Performed by: OPHTHALMOLOGY

## 2021-06-22 PROCEDURE — 92133 POSTERIOR SEGMENT OCT OPTIC NERVE(OCULAR COHERENCE TOMOGRAPHY) - OU - BOTH EYES: ICD-10-PCS | Mod: S$GLB,,, | Performed by: OPHTHALMOLOGY

## 2021-06-22 PROCEDURE — 99999 PR PBB SHADOW E&M-EST. PATIENT-LVL I: CPT | Mod: PBBFAC,,, | Performed by: OPHTHALMOLOGY

## 2021-06-22 PROCEDURE — 92083 EXTENDED VISUAL FIELD XM: CPT | Mod: PBBFAC | Performed by: OPHTHALMOLOGY

## 2021-06-22 PROCEDURE — 99213 PR OFFICE/OUTPT VISIT, EST, LEVL III, 20-29 MIN: ICD-10-PCS | Mod: S$GLB,,, | Performed by: OPHTHALMOLOGY

## 2021-06-22 PROCEDURE — 92133 CPTRZD OPH DX IMG PST SGM ON: CPT | Mod: PBBFAC | Performed by: OPHTHALMOLOGY

## 2021-06-22 PROCEDURE — 99211 OFF/OP EST MAY X REQ PHY/QHP: CPT | Mod: PBBFAC | Performed by: OPHTHALMOLOGY

## 2021-06-22 PROCEDURE — 99999 PR PBB SHADOW E&M-EST. PATIENT-LVL I: ICD-10-PCS | Mod: PBBFAC,,, | Performed by: OPHTHALMOLOGY

## 2021-06-22 RX ORDER — PANTOPRAZOLE SODIUM 40 MG/1
40 TABLET, DELAYED RELEASE ORAL NIGHTLY
COMMUNITY
Start: 2021-06-17

## 2021-06-22 RX ORDER — FENOFIBRATE 160 MG/1
160 TABLET ORAL NIGHTLY
COMMUNITY
Start: 2021-06-17

## 2021-06-22 RX ORDER — OXYBUTYNIN CHLORIDE 5 MG/1
TABLET, EXTENDED RELEASE ORAL
COMMUNITY
Start: 2021-06-17 | End: 2022-10-24

## 2021-12-06 ENCOUNTER — OFFICE VISIT (OUTPATIENT)
Dept: OPHTHALMOLOGY | Facility: CLINIC | Age: 66
End: 2021-12-06
Payer: MEDICARE

## 2021-12-06 DIAGNOSIS — E11.9 TYPE 2 DIABETES MELLITUS WITHOUT COMPLICATION, WITHOUT LONG-TERM CURRENT USE OF INSULIN: ICD-10-CM

## 2021-12-06 DIAGNOSIS — H02.403 PTOSIS OF BOTH EYELIDS: ICD-10-CM

## 2021-12-06 DIAGNOSIS — H40.1131 PRIMARY OPEN ANGLE GLAUCOMA OF BOTH EYES, MILD STAGE: Primary | ICD-10-CM

## 2021-12-06 PROCEDURE — 99213 OFFICE O/P EST LOW 20 MIN: CPT | Mod: S$GLB,,, | Performed by: OPHTHALMOLOGY

## 2021-12-06 PROCEDURE — 99999 PR PBB SHADOW E&M-EST. PATIENT-LVL III: CPT | Mod: PBBFAC,,, | Performed by: OPHTHALMOLOGY

## 2021-12-06 PROCEDURE — 99999 PR PBB SHADOW E&M-EST. PATIENT-LVL III: ICD-10-PCS | Mod: PBBFAC,,, | Performed by: OPHTHALMOLOGY

## 2021-12-06 PROCEDURE — 99213 PR OFFICE/OUTPT VISIT, EST, LEVL III, 20-29 MIN: ICD-10-PCS | Mod: S$GLB,,, | Performed by: OPHTHALMOLOGY

## 2022-10-24 ENCOUNTER — OFFICE VISIT (OUTPATIENT)
Dept: OPHTHALMOLOGY | Facility: CLINIC | Age: 67
End: 2022-10-24
Payer: MEDICARE

## 2022-10-24 DIAGNOSIS — H00.15 CHALAZION OF LEFT LOWER EYELID: ICD-10-CM

## 2022-10-24 DIAGNOSIS — Z98.41 CATARACT EXTRACTION STATUS, RIGHT: ICD-10-CM

## 2022-10-24 DIAGNOSIS — Z98.42 CATARACT EXTRACTION STATUS, LEFT: ICD-10-CM

## 2022-10-24 DIAGNOSIS — E11.9 TYPE 2 DIABETES MELLITUS WITHOUT COMPLICATION, WITHOUT LONG-TERM CURRENT USE OF INSULIN: ICD-10-CM

## 2022-10-24 DIAGNOSIS — H40.1131 PRIMARY OPEN ANGLE GLAUCOMA OF BOTH EYES, MILD STAGE: Primary | ICD-10-CM

## 2022-10-24 DIAGNOSIS — H02.403 PTOSIS OF BOTH EYELIDS: ICD-10-CM

## 2022-10-24 PROCEDURE — 99213 OFFICE O/P EST LOW 20 MIN: CPT | Mod: PBBFAC,PO | Performed by: OPHTHALMOLOGY

## 2022-10-24 PROCEDURE — 99999 PR PBB SHADOW E&M-EST. PATIENT-LVL III: ICD-10-PCS | Mod: PBBFAC,,, | Performed by: OPHTHALMOLOGY

## 2022-10-24 PROCEDURE — 99214 PR OFFICE/OUTPT VISIT, EST, LEVL IV, 30-39 MIN: ICD-10-PCS | Mod: S$GLB,,, | Performed by: OPHTHALMOLOGY

## 2022-10-24 PROCEDURE — 1159F MED LIST DOCD IN RCRD: CPT | Mod: CPTII,S$GLB,, | Performed by: OPHTHALMOLOGY

## 2022-10-24 PROCEDURE — 1159F PR MEDICATION LIST DOCUMENTED IN MEDICAL RECORD: ICD-10-PCS | Mod: CPTII,S$GLB,, | Performed by: OPHTHALMOLOGY

## 2022-10-24 PROCEDURE — 99999 PR PBB SHADOW E&M-EST. PATIENT-LVL III: CPT | Mod: PBBFAC,,, | Performed by: OPHTHALMOLOGY

## 2022-10-24 PROCEDURE — 99214 OFFICE O/P EST MOD 30 MIN: CPT | Mod: S$GLB,,, | Performed by: OPHTHALMOLOGY

## 2022-10-24 NOTE — PROGRESS NOTES
HPI     Glaucoma            Comments: Patient reports for yearly. Denies pain or irritation at this   time. Reports of constant blurred vision at a distance, does not wear   glasses. Reports of drooping on with upper lids, states when he has lids   opened he can see better. Reports of stye on RLL, appears 3-4 days ago.   Using warm compresses PRN             Comments    1. Glaucoma   Glaucoma Laser OD at [facility] early 2015 - no response per patient   2. +DM   3. Corneal FB removed OS 04/07/17   4. Retinal laser ([facility] 2017)   5. PCIOL OS W/ Goniotomy 5/7/2020 SN60WF +19.5 (cde 7.60)/istent ?   6. PCIOL OD W/ GONIOTOMY 1/7/2021 (CDE 6.22, SN60WF 19.5)       GCL: 34/34            Last edited by Dejuan Farah on 10/24/2022  9:06 AM.            Assessment /Plan     For exam results, see Encounter Report.      ICD-10-CM ICD-9-CM    1. Primary open angle glaucoma of both eyes, mild stage  H40.1131 365.11 Some increased IOP yet ONH stable at this time   Follow IOP at this time          365.71       2. Type 2 diabetes mellitus without complication, without long-term current use of insulin  E11.9 250.00 Diabetes with no diabetic retinopathy on dilated exam.   Reviewed diabetic eye precautions including excellent blood sugar control, and importance of regular follow up.           3. Chalazion of left lower eyelid  H00.15 373.2 LLL- continue warm compresses , follow at this time       4. Ptosis of both eyelids  H02.403 374.30 Pt desires consult for Eval with Surgery   Will refer       5. Cataract extraction status, right  Z98.41 V45.61 Doing well       6. Cataract extraction status, left  Z98.42 V45.61 Doing well           RETURN TO CLINIC 3 month IOP at [facility] with GOCT .  Pt has appt with Dr Groves next month for retina Eval

## 2022-10-25 ENCOUNTER — TELEPHONE (OUTPATIENT)
Dept: OPHTHALMOLOGY | Facility: CLINIC | Age: 67
End: 2022-10-25
Payer: MEDICARE

## 2022-10-25 NOTE — TELEPHONE ENCOUNTER
Unable to contact patient - left msg that patient is scheduled to see Dr Nelson on 11/23/22 @ 620 - Patient is to call [facility] if time/ date doesn't work - faxed last visit to Dr Nelson

## 2023-02-20 ENCOUNTER — OFFICE VISIT (OUTPATIENT)
Dept: OPHTHALMOLOGY | Facility: CLINIC | Age: 68
End: 2023-02-20
Payer: MEDICARE

## 2023-02-20 DIAGNOSIS — H00.15 CHALAZION OF LEFT LOWER EYELID: ICD-10-CM

## 2023-02-20 DIAGNOSIS — H02.403 PTOSIS OF BOTH EYELIDS: ICD-10-CM

## 2023-02-20 DIAGNOSIS — H40.1131 PRIMARY OPEN ANGLE GLAUCOMA OF BOTH EYES, MILD STAGE: Primary | ICD-10-CM

## 2023-02-20 DIAGNOSIS — Z98.42 CATARACT EXTRACTION STATUS, LEFT: ICD-10-CM

## 2023-02-20 DIAGNOSIS — E11.9 TYPE 2 DIABETES MELLITUS WITHOUT COMPLICATION, WITHOUT LONG-TERM CURRENT USE OF INSULIN: ICD-10-CM

## 2023-02-20 DIAGNOSIS — Z98.41 CATARACT EXTRACTION STATUS, RIGHT: ICD-10-CM

## 2023-02-20 PROCEDURE — 99214 PR OFFICE/OUTPT VISIT, EST, LEVL IV, 30-39 MIN: ICD-10-PCS | Mod: S$GLB,,, | Performed by: OPHTHALMOLOGY

## 2023-02-20 PROCEDURE — 92133 CPTRZD OPH DX IMG PST SGM ON: CPT | Mod: S$GLB,,, | Performed by: OPHTHALMOLOGY

## 2023-02-20 PROCEDURE — 1159F MED LIST DOCD IN RCRD: CPT | Mod: CPTII,S$GLB,, | Performed by: OPHTHALMOLOGY

## 2023-02-20 PROCEDURE — 99214 OFFICE O/P EST MOD 30 MIN: CPT | Mod: S$GLB,,, | Performed by: OPHTHALMOLOGY

## 2023-02-20 PROCEDURE — 99999 PR PBB SHADOW E&M-EST. PATIENT-LVL III: ICD-10-PCS | Mod: PBBFAC,,, | Performed by: OPHTHALMOLOGY

## 2023-02-20 PROCEDURE — 99999 PR PBB SHADOW E&M-EST. PATIENT-LVL III: CPT | Mod: PBBFAC,,, | Performed by: OPHTHALMOLOGY

## 2023-02-20 PROCEDURE — 1160F PR REVIEW ALL MEDS BY PRESCRIBER/CLIN PHARMACIST DOCUMENTED: ICD-10-PCS | Mod: CPTII,S$GLB,, | Performed by: OPHTHALMOLOGY

## 2023-02-20 PROCEDURE — 1160F RVW MEDS BY RX/DR IN RCRD: CPT | Mod: CPTII,S$GLB,, | Performed by: OPHTHALMOLOGY

## 2023-02-20 PROCEDURE — 1159F PR MEDICATION LIST DOCUMENTED IN MEDICAL RECORD: ICD-10-PCS | Mod: CPTII,S$GLB,, | Performed by: OPHTHALMOLOGY

## 2023-02-20 PROCEDURE — 92133 POSTERIOR SEGMENT OCT OPTIC NERVE(OCULAR COHERENCE TOMOGRAPHY) - OU - BOTH EYES: ICD-10-PCS | Mod: S$GLB,,, | Performed by: OPHTHALMOLOGY

## 2023-02-20 RX ORDER — TIMOLOL MALEATE 5 MG/ML
1 SOLUTION/ DROPS OPHTHALMIC EVERY MORNING
Qty: 10 ML | Refills: 5 | Status: SHIPPED | OUTPATIENT
Start: 2023-02-20 | End: 2023-03-13

## 2023-02-20 NOTE — PROGRESS NOTES
HPI     Glaucoma            Comments: Patient reports for 3 month IOP check. Using gtts as advised.   Denies pain or irritation at this time. Patient reports of visual   stability since previous visit. Has seen Dr Groves, had PRP OS done late   2022. Recent visit states every thing was stable post laser, seeing him   again in a few months. Had ptosis repair 2/6/23 with Dr Emmanuel Nelson,   stable post op-- seeing him again in ~6 weeks.             Comments    1. Glaucoma   Glaucoma Laser OD at [facility] early 2015 - no response per patient   2. +DM   3. Corneal FB removed OS 04/07/17   4. PRP OS ([facility] 2017, 2022)  5. PCIOL OS W/ Goniotomy 5/7/2020 SN60WF +19.5 (cde 7.60)/istent ?   6. PCIOL OD W/ GONIOTOMY 1/7/2021 (CDE 6.22, SN60WF 19.5)   7. Ptosis repair OU 2/6/23 (Dr Emmanuel Nelson at [facility])      GCL: 34/34- 6/2021            Last edited by Dejuan Farah on 2/20/2023  9:44 AM.            Assessment /Plan     For exam results, see Encounter Report.      ICD-10-CM ICD-9-CM    1. Primary open angle glaucoma of both eyes, mild stage  H40.1131 365.11 Posterior Segment OCT Optic Nerve- Both eyes     365.71 timolol maleate 0.5% (TIMOPTIC) 0.5 % Drop    INCREASED IOP OU TODAY AND PREVIOUS VISIT   ADD TIMOLOL QAM OU TRIAL       2. Type 2 diabetes mellitus without complication, without long-term current use of insulin  E11.9 250.00 Not due for dilation at this time       3. Chalazion of left lower eyelid  H00.15 373.2 H/o -       4. Ptosis of both eyelids  H02.403 374.30 Follow       5. Cataract extraction status, right  Z98.41 V45.61       6. Cataract extraction status, left  Z98.42 V45.61           RETURN TO CLINIC 4-8 week IOP,   Timolol QAM OU , trial start today

## 2023-03-27 DIAGNOSIS — M75.100 SUPRASPINATUS TENDON TEAR: Primary | ICD-10-CM

## 2023-03-28 ENCOUNTER — OFFICE VISIT (OUTPATIENT)
Dept: ORTHOPEDICS | Facility: CLINIC | Age: 68
End: 2023-03-28
Payer: MEDICARE

## 2023-03-28 ENCOUNTER — HOSPITAL ENCOUNTER (OUTPATIENT)
Dept: RADIOLOGY | Facility: HOSPITAL | Age: 68
Discharge: HOME | End: 2023-03-28
Payer: MEDICARE

## 2023-03-28 ENCOUNTER — HOSPITAL ENCOUNTER (OUTPATIENT)
Dept: RADIOLOGY | Facility: CLINIC | Age: 68
Discharge: HOME | End: 2023-03-28
Attending: ORTHOPAEDIC SURGERY
Payer: MEDICARE

## 2023-03-28 VITALS
HEIGHT: 70 IN | SYSTOLIC BLOOD PRESSURE: 142 MMHG | OXYGEN SATURATION: 95 % | TEMPERATURE: 98 F | DIASTOLIC BLOOD PRESSURE: 90 MMHG | HEART RATE: 68 BPM | BODY MASS INDEX: 29.58 KG/M2 | WEIGHT: 206.63 LBS

## 2023-03-28 DIAGNOSIS — S46.012A TRAUMATIC COMPLETE TEAR OF LEFT ROTATOR CUFF, INITIAL ENCOUNTER: Primary | ICD-10-CM

## 2023-03-28 DIAGNOSIS — M75.100 SUPRASPINATUS TENDON TEAR: ICD-10-CM

## 2023-03-28 DIAGNOSIS — S46.012A TRAUMATIC COMPLETE TEAR OF LEFT ROTATOR CUFF, INITIAL ENCOUNTER: ICD-10-CM

## 2023-03-28 DIAGNOSIS — M75.22 BICIPITAL TENDINITIS OF LEFT SHOULDER: ICD-10-CM

## 2023-03-28 DIAGNOSIS — M25.512 LEFT SHOULDER PAIN, UNSPECIFIED CHRONICITY: ICD-10-CM

## 2023-03-28 DIAGNOSIS — M19.012 ARTHROSIS OF LEFT ACROMIOCLAVICULAR JOINT: ICD-10-CM

## 2023-03-28 DIAGNOSIS — Z01.818 PREOPERATIVE TESTING: ICD-10-CM

## 2023-03-28 PROCEDURE — 3080F PR MOST RECENT DIASTOLIC BLOOD PRESSURE >= 90 MM HG: ICD-10-PCS | Mod: CPTII,,, | Performed by: ORTHOPAEDIC SURGERY

## 2023-03-28 PROCEDURE — 3008F PR BODY MASS INDEX (BMI) DOCUMENTED: ICD-10-PCS | Mod: CPTII,,, | Performed by: ORTHOPAEDIC SURGERY

## 2023-03-28 PROCEDURE — 73030 XR SHOULDER COMPLETE 2 OR MORE VIEWS LEFT: ICD-10-PCS | Mod: LT,,, | Performed by: ORTHOPAEDIC SURGERY

## 2023-03-28 PROCEDURE — 1160F PR REVIEW ALL MEDS BY PRESCRIBER/CLIN PHARMACIST DOCUMENTED: ICD-10-PCS | Mod: CPTII,,, | Performed by: ORTHOPAEDIC SURGERY

## 2023-03-28 PROCEDURE — 71046 X-RAY EXAM CHEST 2 VIEWS: CPT | Mod: TC

## 2023-03-28 PROCEDURE — 3077F PR MOST RECENT SYSTOLIC BLOOD PRESSURE >= 140 MM HG: ICD-10-PCS | Mod: CPTII,,, | Performed by: ORTHOPAEDIC SURGERY

## 2023-03-28 PROCEDURE — 1160F RVW MEDS BY RX/DR IN RCRD: CPT | Mod: CPTII,,, | Performed by: ORTHOPAEDIC SURGERY

## 2023-03-28 PROCEDURE — 3288F PR FALLS RISK ASSESSMENT DOCUMENTED: ICD-10-PCS | Mod: CPTII,,, | Performed by: ORTHOPAEDIC SURGERY

## 2023-03-28 PROCEDURE — 1101F PR PT FALLS ASSESS DOC 0-1 FALLS W/OUT INJ PAST YR: ICD-10-PCS | Mod: CPTII,,, | Performed by: ORTHOPAEDIC SURGERY

## 2023-03-28 PROCEDURE — 1159F PR MEDICATION LIST DOCUMENTED IN MEDICAL RECORD: ICD-10-PCS | Mod: CPTII,,, | Performed by: ORTHOPAEDIC SURGERY

## 2023-03-28 PROCEDURE — 3008F BODY MASS INDEX DOCD: CPT | Mod: CPTII,,, | Performed by: ORTHOPAEDIC SURGERY

## 2023-03-28 PROCEDURE — 1125F AMNT PAIN NOTED PAIN PRSNT: CPT | Mod: CPTII,,, | Performed by: ORTHOPAEDIC SURGERY

## 2023-03-28 PROCEDURE — 99204 PR OFFICE/OUTPT VISIT, NEW, LEVL IV, 45-59 MIN: ICD-10-PCS | Mod: ,,, | Performed by: ORTHOPAEDIC SURGERY

## 2023-03-28 PROCEDURE — 3077F SYST BP >= 140 MM HG: CPT | Mod: CPTII,,, | Performed by: ORTHOPAEDIC SURGERY

## 2023-03-28 PROCEDURE — 3080F DIAST BP >= 90 MM HG: CPT | Mod: CPTII,,, | Performed by: ORTHOPAEDIC SURGERY

## 2023-03-28 PROCEDURE — 1125F PR PAIN SEVERITY QUANTIFIED, PAIN PRESENT: ICD-10-PCS | Mod: CPTII,,, | Performed by: ORTHOPAEDIC SURGERY

## 2023-03-28 PROCEDURE — 3288F FALL RISK ASSESSMENT DOCD: CPT | Mod: CPTII,,, | Performed by: ORTHOPAEDIC SURGERY

## 2023-03-28 PROCEDURE — 1101F PT FALLS ASSESS-DOCD LE1/YR: CPT | Mod: CPTII,,, | Performed by: ORTHOPAEDIC SURGERY

## 2023-03-28 PROCEDURE — 1159F MED LIST DOCD IN RCRD: CPT | Mod: CPTII,,, | Performed by: ORTHOPAEDIC SURGERY

## 2023-03-28 PROCEDURE — 73030 X-RAY EXAM OF SHOULDER: CPT | Mod: LT,,, | Performed by: ORTHOPAEDIC SURGERY

## 2023-03-28 PROCEDURE — 99204 OFFICE O/P NEW MOD 45 MIN: CPT | Mod: ,,, | Performed by: ORTHOPAEDIC SURGERY

## 2023-03-28 RX ORDER — GABAPENTIN 100 MG/1
300 CAPSULE ORAL
Status: CANCELLED | OUTPATIENT
Start: 2023-03-28

## 2023-03-28 RX ORDER — SCOLOPAMINE TRANSDERMAL SYSTEM 1 MG/1
1 PATCH, EXTENDED RELEASE TRANSDERMAL ONCE AS NEEDED
Status: CANCELLED | OUTPATIENT
Start: 2023-03-28 | End: 2034-08-24

## 2023-03-28 RX ORDER — ACETAMINOPHEN 500 MG
1000 TABLET ORAL
Status: CANCELLED | OUTPATIENT
Start: 2023-03-28

## 2023-03-28 RX ORDER — SODIUM CHLORIDE, SODIUM GLUCONATE, SODIUM ACETATE, POTASSIUM CHLORIDE AND MAGNESIUM CHLORIDE 30; 37; 368; 526; 502 MG/100ML; MG/100ML; MG/100ML; MG/100ML; MG/100ML
250 INJECTION, SOLUTION INTRAVENOUS CONTINUOUS
Status: CANCELLED | OUTPATIENT
Start: 2023-03-28

## 2023-03-28 NOTE — PROGRESS NOTES
[facility]  Orthopedic Clinic Note      Chief Complaint:   Chief Complaint   Patient presents with    Left Shoulder - Pain     Visit for left shoulder pain. 6 weeks ago he grabbed the wall so he wouldn't fall and started feeling some pain instantly. Pain is a 10/10 on worse day. Not taking anything for pain. MRI in chart       Referring Physician: Nikhil Gray MD      History of present illness:    This is a 67-year-old male presenting today with left shoulder pain for approximately 6 weeks.  He reports that he became unbalanced when walking up his stairs approximately 6 weeks ago when he reached out to steady himself and felt a tearing sensation in his shoulder.  This was associated with immediate, severe pain.  This shoulder pain is moderate to severe, and rated 10/10 in severity at times.  Patient reports increased pain with overhead movement.  Patient reports night pain.  Patient reports anterolateral shoulder pain that radiates down the arm.  His symptoms are greatly impacting his ability to perform his normal activities of daily living.  He was evaluated by PCP for the same complaint who ordered an MRI of his left shoulder.  MRI of the left shoulder demonstrated a high grade, near complete tear of the supraspinatus tendon, tendinosis of the long head of the biceps tendon, and acromioclavicular joint arthrosis.  He has taken over-the-counter medications with minimal improvement in his symptoms.      Past Medical History:   Diagnosis Date    Arthritis     Circumcision complication     Cortical cataract of both eyes 10/19/2015    Glaucoma     Gout     Type 2 diabetes mellitus without complication, without long-term current use of insulin 2/4/2019       Past Surgical History:   Procedure Laterality Date    CARPAL TUNNEL RELEASE      CATARACT EXTRACTION      ESOPHAGUS SURGERY      SLT- OD - RIGHT EYE Right [id number]    BY DR. MORALES    WRIST SURGERY         Current Outpatient Medications    Medication Sig    allopurinol (ZYLOPRIM) 300 MG tablet allopurinol 300 mg tablet   Take 1.5 tablet daily as directed    atorvastatin (LIPITOR) 20 MG tablet Take 20 mg by mouth once daily.    fenofibrate 160 MG Tab     JANUVIA 100 mg Tab     JARDIANCE 10 mg Tab     timolol maleate 0.5% (TIMOPTIC) 0.5 % Drop Place 1 drop into both eyes once daily.    escitalopram oxalate (LEXAPRO) 20 MG tablet Take 20 mg by mouth once daily.    glipiZIDE (GLUCOTROL) 5 MG tablet Take 5 mg by mouth 2 (two) times daily before meals.    indomethacin (INDOCIN) 50 MG capsule Take 1 capsule (50 mg total) by mouth 3 (three) times daily as needed. (Patient not taking: Reported on 3/28/2023)    influenza (AFLURIA, FLULAVAL, FLUARIX QUADRIVALENT) 60 mcg (15 mcg x 4)/0.5 mL Syrg vaccine Fluarix Quad 7499-3889 (PF) 60 mcg (15 mcg x 4)/0.5 mL IM syringe    lisinopril (PRINIVIL,ZESTRIL) 5 MG tablet Take 5 mg by mouth once daily.    omeprazole (PRILOSEC) 10 MG capsule Take 10 mg by mouth once daily.    pantoprazole (PROTONIX) 40 MG tablet     venlafaxine (EFFEXOR-XR) 37.5 MG 24 hr capsule      No current facility-administered medications for this visit.       Review of patient's allergies indicates:   Allergen Reactions    Diph,pertuss(acel),tet vac(pf) Anaphylaxis    Cetylpyridinium-benzocaine Hives    Azithromycin Hives    Diclofenac submicronized Other (See Comments)    Penicillin g potassium Other (See Comments)    E-mycin [erythromycin] Rash       Family History   Problem Relation Age of Onset    Glaucoma Sister     Glaucoma Maternal Grandmother        Social History     Socioeconomic History    Marital status: Single   Tobacco Use    Smoking status: Former     Types: Cigars    Smokeless tobacco: Never    Tobacco comments:     marijuana   Substance and Sexual Activity    Alcohol use: No    Drug use: Not Currently    Sexual activity: Not Currently       Chief Complaint:   Chief Complaint   Patient presents with    Left Shoulder - Pain     Visit  "for left shoulder pain. 6 weeks ago he grabbed the wall so he wouldn't fall and started feeling some pain instantly. Pain is a 10/10 on worse day. Not taking anything for pain. MRI in chart         Consulting Physician: Nikhil Gray MD      Review of Systems:    All review of systems negative except for those stated in the HPI    Examination:    Vital Signs:    Vitals:    03/28/23 0851 03/28/23 0856   BP: (!) 142/90    Pulse: 68    Temp: 98.2 °F (36.8 °C)    SpO2: 95%    Weight: 93.7 kg (206 lb 9.6 oz)    Height: 5' 10" (1.778 m)    PainSc:  10-Worst pain ever       Body mass index is 29.64 kg/m².    Physical Exam:   General: Well-developed, well-nourished.  Neuro: Alert and oriented x 3.  Psych: Normal mood and affect.  Left Shoulder Exam:  No obvious deformity. No medial or lateral scapula winging. Forward flexion to 90 degrees and abduction to 90 degrees. Positive empty can, positive Whipple, Positive drop arm test, Chakraborty, impingement, Positive AC joint tenderness. Negative biceps groove tenderness, Positive Sutton´s, Yergason´s, Speed test. Negative Apprehension and Relocation test. 4/5 strength, normal skin appearance and palpable pulses distally. Sensibility normal.      Imaging: X-rays ordered and images interpreted today personally by me of four views of the left shoulder demonstrate acromioclavicular joint arthrosis and degenerative changes.        Assessment: Traumatic complete tear of left rotator cuff, initial encounter  -     Place in Outpatient; Standing  -     Vital signs; Standing  -     Insert peripheral IV; Standing  -     Clip and Prep Other (please specifiy) (Operative site); Standing  -     Cleanse with Chlorhexidine (CHG); Standing  -     Diet NPO; Standing  -     CBC auto differential; Future  -     Comprehensive metabolic panel; Future  -     X-Ray Chest PA And Lateral; Future; Expected date: 03/28/2023  -     EKG 12-lead; Future  -     Inpatient consult to Anesthesiology; " Standing  -     Case Request Operating Room: REPAIR, ROTATOR CUFF, ARTHROSCOPIC, ARTHROSCOPY,SHOULDER,WITH BICEPS TENODESIS, ARTHROSCOPY, SHOULDER, WITH DISTAL CLAVICLE EXCISION  -     Place sequential compression device; Standing    Supraspinatus tendon tear  -     Ambulatory referral/consult to Orthopedics    Bicipital tendinitis of left shoulder  -     Place in Outpatient; Standing  -     Vital signs; Standing  -     Insert peripheral IV; Standing  -     Clip and Prep Other (please specifiy) (Operative site); Standing  -     Cleanse with Chlorhexidine (CHG); Standing  -     Diet NPO; Standing  -     CBC auto differential; Future  -     Comprehensive metabolic panel; Future  -     X-Ray Chest PA And Lateral; Future; Expected date: 03/28/2023  -     EKG 12-lead; Future  -     Inpatient consult to Anesthesiology; Standing  -     Case Request Operating Room: REPAIR, ROTATOR CUFF, ARTHROSCOPIC, ARTHROSCOPY,SHOULDER,WITH BICEPS TENODESIS, ARTHROSCOPY, SHOULDER, WITH DISTAL CLAVICLE EXCISION  -     Place sequential compression device; Standing    Arthrosis of left acromioclavicular joint  -     Place in Outpatient; Standing  -     Vital signs; Standing  -     Insert peripheral IV; Standing  -     Clip and Prep Other (please specifiy) (Operative site); Standing  -     Cleanse with Chlorhexidine (CHG); Standing  -     Diet NPO; Standing  -     CBC auto differential; Future  -     Comprehensive metabolic panel; Future  -     X-Ray Chest PA And Lateral; Future; Expected date: 03/28/2023  -     EKG 12-lead; Future  -     Inpatient consult to Anesthesiology; Standing  -     Case Request Operating Room: REPAIR, ROTATOR CUFF, ARTHROSCOPIC, ARTHROSCOPY,SHOULDER,WITH BICEPS TENODESIS, ARTHROSCOPY, SHOULDER, WITH DISTAL CLAVICLE EXCISION  -     Place sequential compression device; Standing    Preoperative testing  -     CBC auto differential; Future    Left shoulder pain, unspecified chronicity  -     X-Ray Shoulder 2 or More  Views Left; Future; Expected date: 03/28/2023    Other orders  -     electrolyte-A infusion  -     IP VTE LOW RISK PATIENT; Standing  -     acetaminophen tablet 1,000 mg  -     gabapentin capsule 300 mg  -     scopolamine 1.3-1.5 mg (1 mg over 3 days) 1 patch  -     clindamycin (CLEOCIN) 900 mg in dextrose 5 % (D5W) 50 mL IVPB        Plan:  X-rays were reviewed with the patient.  Prior MRI results reviewed with the patient.  Due to the severity of the rotator cuff tear, recommend surgical intervention via arthroscopic left rotator cuff repair with possible biceps tenotomy versus tenodesis, and distal clavicle excision.  He would like to proceed with surgical intervention.  Plan for surgical intervention on 04/05/2023.  Patient watched a video on the procedure and the associated complications.  We had an extensive discussion about the surgical procedure, the perioperative recovery, and postoperative recovery.  The proposed procedure as well as associated risks/benefits were discussed at length with the patient and family with risks to include pain, bleeding, infection, future surgeries, neurovascular compromise, loss of limb, heart attack, stroke, deep vein thrombosis, and even death.  Patient understands risks, benefits, and alternatives.  Patient signed an informed consent.  Patient will be placed on DVT prophylaxis.  This patient will require a postoperative abduction pillow sling to unload the rotator cuff, labrum, and/or biceps tendon repair to allow for optimal postoperative healing.    Juanpablo Bourgeois MD personally performed the services described in this documentation, including but not limited to patient's history, physical examination, and assessment and plan of care. All medical record entries made by REINA Jimenez were performed at his direction and in his presence. The medical record was reviewed and is accurate and complete.            DISCLAIMER: This note may have been dictated using voice  recognition software and may contain grammatical errors.     NOTE: Consult report sent to referring provider via [employer] EMR.

## 2023-03-29 ENCOUNTER — ANESTHESIA EVENT (OUTPATIENT)
Dept: SURGERY | Facility: HOSPITAL | Age: 68
End: 2023-03-29
Payer: MEDICARE

## 2023-04-03 ENCOUNTER — OFFICE VISIT (OUTPATIENT)
Dept: OPHTHALMOLOGY | Facility: CLINIC | Age: 68
End: 2023-04-03
Payer: MEDICARE

## 2023-04-03 DIAGNOSIS — H40.1131 PRIMARY OPEN ANGLE GLAUCOMA OF BOTH EYES, MILD STAGE: Primary | ICD-10-CM

## 2023-04-03 DIAGNOSIS — H02.403 PTOSIS OF BOTH EYELIDS: ICD-10-CM

## 2023-04-03 DIAGNOSIS — E11.69 TYPE 2 DIABETES MELLITUS WITH OTHER SPECIFIED COMPLICATION, WITHOUT LONG-TERM CURRENT USE OF INSULIN: ICD-10-CM

## 2023-04-03 PROCEDURE — 99213 OFFICE O/P EST LOW 20 MIN: CPT | Mod: S$GLB,,, | Performed by: OPHTHALMOLOGY

## 2023-04-03 PROCEDURE — 1160F PR REVIEW ALL MEDS BY PRESCRIBER/CLIN PHARMACIST DOCUMENTED: ICD-10-PCS | Mod: CPTII,S$GLB,, | Performed by: OPHTHALMOLOGY

## 2023-04-03 PROCEDURE — 1159F PR MEDICATION LIST DOCUMENTED IN MEDICAL RECORD: ICD-10-PCS | Mod: CPTII,S$GLB,, | Performed by: OPHTHALMOLOGY

## 2023-04-03 PROCEDURE — 99999 PR PBB SHADOW E&M-EST. PATIENT-LVL III: ICD-10-PCS | Mod: PBBFAC,,, | Performed by: OPHTHALMOLOGY

## 2023-04-03 PROCEDURE — 99999 PR PBB SHADOW E&M-EST. PATIENT-LVL III: CPT | Mod: PBBFAC,,, | Performed by: OPHTHALMOLOGY

## 2023-04-03 PROCEDURE — 1160F RVW MEDS BY RX/DR IN RCRD: CPT | Mod: CPTII,S$GLB,, | Performed by: OPHTHALMOLOGY

## 2023-04-03 PROCEDURE — 1159F MED LIST DOCD IN RCRD: CPT | Mod: CPTII,S$GLB,, | Performed by: OPHTHALMOLOGY

## 2023-04-03 PROCEDURE — 99213 PR OFFICE/OUTPT VISIT, EST, LEVL III, 20-29 MIN: ICD-10-PCS | Mod: S$GLB,,, | Performed by: OPHTHALMOLOGY

## 2023-04-03 RX ORDER — MULTIVITAMIN
1 TABLET ORAL DAILY
COMMUNITY

## 2023-04-03 RX ORDER — TAMSULOSIN HYDROCHLORIDE 0.4 MG/1
1 CAPSULE ORAL NIGHTLY
COMMUNITY
Start: 2023-03-10

## 2023-04-03 RX ORDER — CETIRIZINE HYDROCHLORIDE 5 MG/1
5 TABLET ORAL DAILY
COMMUNITY

## 2023-04-03 RX ORDER — VITAMIN B COMPLEX
1 CAPSULE ORAL DAILY
COMMUNITY

## 2023-04-03 NOTE — PROGRESS NOTES
HPI     Glaucoma            Comments: 8 week IOP check after starting Timolol OU    Patient states no pain or irritation from the new drop and using as   directed.          Comments    1. Glaucoma   Glaucoma Laser OD at [facility] early 2015 - no response per patient   2. +DM   3. Corneal FB removed OS 04/07/17   4. PRP OS ([facility] 2017, 2022)  5. PCIOL OS W/ Goniotomy 5/7/2020 SN60WF +19.5 (cde 7.60)/istent ?   6. PCIOL OD W/ GONIOTOMY 1/7/2021 (CDE 6.22, SN60WF 19.5)   7. Ptosis repair OU 2/6/23 (Dr Emmanuel Nelson at [facility])    GCL: 34/34- 6/2021  Gcl: 32/33 -- 2/2023        Timolol QAM OU          Last edited by Janell Lopez, Patient Care Assistant on 4/3/2023  9:27 AM.              Assessment /Plan     For exam results, see Encounter Report.      ICD-10-CM ICD-9-CM    1. Primary open angle glaucoma of both eyes, mild stage  H40.1131 365.11 Great response to Timolol, continue qam     365.71       2. Type 2 diabetes mellitus with other specified complication, without long-term current use of insulin  E11.69 250.80 Monitor with Dr Groves, currently receiving PRP      3. Ptosis of both eyelids  H02.403 374.30 S/p ptosis repair with Dr Emmanuel Nelson 2/6/23-- doing well           Return to clinic 4 months for IOP      Timolol QAM OU

## 2023-04-04 DIAGNOSIS — S46.012A TRAUMATIC COMPLETE TEAR OF LEFT ROTATOR CUFF, INITIAL ENCOUNTER: Primary | ICD-10-CM

## 2023-04-05 ENCOUNTER — ANESTHESIA (OUTPATIENT)
Dept: SURGERY | Facility: HOSPITAL | Age: 68
End: 2023-04-05
Payer: MEDICARE

## 2023-04-05 ENCOUNTER — HOSPITAL ENCOUNTER (OUTPATIENT)
Facility: HOSPITAL | Age: 68
Discharge: HOME | End: 2023-04-05
Attending: ORTHOPAEDIC SURGERY | Admitting: ORTHOPAEDIC SURGERY
Payer: MEDICARE

## 2023-04-05 DIAGNOSIS — M75.22 BICIPITAL TENDINITIS OF LEFT SHOULDER: ICD-10-CM

## 2023-04-05 DIAGNOSIS — S46.012A TRAUMATIC COMPLETE TEAR OF LEFT ROTATOR CUFF, INITIAL ENCOUNTER: Primary | ICD-10-CM

## 2023-04-05 DIAGNOSIS — M19.012 ARTHROSIS OF LEFT ACROMIOCLAVICULAR JOINT: ICD-10-CM

## 2023-04-05 LAB — POCT GLUCOSE: 141 MG/DL (ref 70–110)

## 2023-04-05 PROCEDURE — 27201423 OPTIME MED/SURG SUP & DEVICES STERILE SUPPLY: Performed by: ORTHOPAEDIC SURGERY

## 2023-04-05 PROCEDURE — 36000710: Performed by: ORTHOPAEDIC SURGERY

## 2023-04-05 PROCEDURE — D9220A PRA ANESTHESIA: ICD-10-PCS | Mod: CRNA,,, | Performed by: NURSE ANESTHETIST, CERTIFIED REGISTERED

## 2023-04-05 PROCEDURE — 25000003 PHARM REV CODE 250: Performed by: NURSE ANESTHETIST, CERTIFIED REGISTERED

## 2023-04-05 PROCEDURE — C1713 ANCHOR/SCREW BN/BN,TIS/BN: HCPCS | Performed by: ORTHOPAEDIC SURGERY

## 2023-04-05 PROCEDURE — D9220A PRA ANESTHESIA: Mod: CRNA,,, | Performed by: NURSE ANESTHETIST, CERTIFIED REGISTERED

## 2023-04-05 PROCEDURE — 29827 SHO ARTHRS SRG RT8TR CUF RPR: CPT | Mod: AS,LT,,

## 2023-04-05 PROCEDURE — 37000008 HC ANESTHESIA 1ST 15 MINUTES: Performed by: ORTHOPAEDIC SURGERY

## 2023-04-05 PROCEDURE — 82962 GLUCOSE BLOOD TEST: CPT | Performed by: ORTHOPAEDIC SURGERY

## 2023-04-05 PROCEDURE — 29824 PR SHLDR ARTHROSCOP,SURG,DIS CLAVICULECTOMY: ICD-10-PCS | Mod: AS,51,LT,

## 2023-04-05 PROCEDURE — 37000009 HC ANESTHESIA EA ADD 15 MINS: Performed by: ORTHOPAEDIC SURGERY

## 2023-04-05 PROCEDURE — 64415 PERIPHERAL BLOCK: ICD-10-PCS | Mod: 59,LT,, | Performed by: ANESTHESIOLOGY

## 2023-04-05 PROCEDURE — 25000003 PHARM REV CODE 250

## 2023-04-05 PROCEDURE — 71000016 HC POSTOP RECOV ADDL HR: Performed by: ORTHOPAEDIC SURGERY

## 2023-04-05 PROCEDURE — 71000015 HC POSTOP RECOV 1ST HR: Performed by: ORTHOPAEDIC SURGERY

## 2023-04-05 PROCEDURE — 64415 NJX AA&/STRD BRCH PLXS IMG: CPT | Mod: 59,LT,, | Performed by: ANESTHESIOLOGY

## 2023-04-05 PROCEDURE — 71000033 HC RECOVERY, INTIAL HOUR: Performed by: ORTHOPAEDIC SURGERY

## 2023-04-05 PROCEDURE — D9220A PRA ANESTHESIA: ICD-10-PCS | Mod: ANES,,, | Performed by: ANESTHESIOLOGY

## 2023-04-05 PROCEDURE — 29827 PR SHLDR ARTHROSCOP,SURG,W/ROTAT CUFF REPR: ICD-10-PCS | Mod: LT,,, | Performed by: ORTHOPAEDIC SURGERY

## 2023-04-05 PROCEDURE — D9220A PRA ANESTHESIA: Mod: ANES,,, | Performed by: ANESTHESIOLOGY

## 2023-04-05 PROCEDURE — 63600175 PHARM REV CODE 636 W HCPCS: Performed by: ANESTHESIOLOGY

## 2023-04-05 PROCEDURE — 29824 PR SHLDR ARTHROSCOP,SURG,DIS CLAVICULECTOMY: ICD-10-PCS | Mod: 51,LT,, | Performed by: ORTHOPAEDIC SURGERY

## 2023-04-05 PROCEDURE — 63600175 PHARM REV CODE 636 W HCPCS: Performed by: NURSE ANESTHETIST, CERTIFIED REGISTERED

## 2023-04-05 PROCEDURE — 36000711: Performed by: ORTHOPAEDIC SURGERY

## 2023-04-05 PROCEDURE — 29827 SHO ARTHRS SRG RT8TR CUF RPR: CPT | Mod: LT,,, | Performed by: ORTHOPAEDIC SURGERY

## 2023-04-05 PROCEDURE — 29824 SHO ARTHRS SRG DSTL CLAVICLC: CPT | Mod: AS,51,LT,

## 2023-04-05 PROCEDURE — 29827 PR SHLDR ARTHROSCOP,SURG,W/ROTAT CUFF REPR: ICD-10-PCS | Mod: AS,LT,,

## 2023-04-05 PROCEDURE — 64415 NJX AA&/STRD BRCH PLXS IMG: CPT | Performed by: ANESTHESIOLOGY

## 2023-04-05 PROCEDURE — 25000003 PHARM REV CODE 250: Performed by: ANESTHESIOLOGY

## 2023-04-05 PROCEDURE — 29824 SHO ARTHRS SRG DSTL CLAVICLC: CPT | Mod: 51,LT,, | Performed by: ORTHOPAEDIC SURGERY

## 2023-04-05 DEVICE — KL FBTK RC,(BLK/BLU)FT & (BLU/BLK)#2 MTS
Type: IMPLANTABLE DEVICE | Site: SHOULDER | Status: FUNCTIONAL
Brand: ARTHREX®

## 2023-04-05 DEVICE — PEEK SWIVELOCK C, 5.5X19.1MM
Type: IMPLANTABLE DEVICE | Site: SHOULDER | Status: FUNCTIONAL
Brand: ARTHREX®

## 2023-04-05 RX ORDER — MAG HYDROX/ALUMINUM HYD/SIMETH 200-200-20
30 SUSPENSION, ORAL (FINAL DOSE FORM) ORAL EVERY 6 HOURS PRN
Status: DISCONTINUED | OUTPATIENT
Start: 2023-04-05 | End: 2023-04-05 | Stop reason: HOSPADM

## 2023-04-05 RX ORDER — PHENYLEPHRINE HYDROCHLORIDE 10 MG/ML
INJECTION INTRAVENOUS
Status: DISCONTINUED | OUTPATIENT
Start: 2023-04-05 | End: 2023-04-05

## 2023-04-05 RX ORDER — PROCHLORPERAZINE EDISYLATE 5 MG/ML
5 INJECTION INTRAMUSCULAR; INTRAVENOUS EVERY 30 MIN PRN
Status: DISCONTINUED | OUTPATIENT
Start: 2023-04-05 | End: 2023-04-05 | Stop reason: HOSPADM

## 2023-04-05 RX ORDER — MIDAZOLAM HYDROCHLORIDE 1 MG/ML
2 INJECTION INTRAMUSCULAR; INTRAVENOUS
Status: DISCONTINUED | OUTPATIENT
Start: 2023-04-05 | End: 2023-04-05 | Stop reason: HOSPADM

## 2023-04-05 RX ORDER — OXYCODONE HYDROCHLORIDE 5 MG/1
5 TABLET ORAL EVERY 12 HOURS PRN
Qty: 14 TABLET | Refills: 0 | Status: SHIPPED | OUTPATIENT
Start: 2023-04-05 | End: 2023-04-12

## 2023-04-05 RX ORDER — ONDANSETRON 4 MG/1
4 TABLET, ORALLY DISINTEGRATING ORAL EVERY 6 HOURS PRN
Qty: 30 TABLET | Refills: 0 | Status: SHIPPED | OUTPATIENT
Start: 2023-04-05 | End: 2023-04-15

## 2023-04-05 RX ORDER — ONDANSETRON 2 MG/ML
4 INJECTION INTRAMUSCULAR; INTRAVENOUS DAILY PRN
Status: DISCONTINUED | OUTPATIENT
Start: 2023-04-05 | End: 2023-04-05 | Stop reason: HOSPADM

## 2023-04-05 RX ORDER — SODIUM CHLORIDE, SODIUM GLUCONATE, SODIUM ACETATE, POTASSIUM CHLORIDE AND MAGNESIUM CHLORIDE 30; 37; 368; 526; 502 MG/100ML; MG/100ML; MG/100ML; MG/100ML; MG/100ML
250 INJECTION, SOLUTION INTRAVENOUS CONTINUOUS
Status: DISCONTINUED | OUTPATIENT
Start: 2023-04-05 | End: 2023-04-05 | Stop reason: HOSPADM

## 2023-04-05 RX ORDER — MELOXICAM 7.5 MG/1
TABLET ORAL
Qty: 30 TABLET | Refills: 0 | Status: SHIPPED | OUTPATIENT
Start: 2023-04-11 | End: 2023-04-20

## 2023-04-05 RX ORDER — LIDOCAINE HYDROCHLORIDE 20 MG/ML
INJECTION, SOLUTION EPIDURAL; INFILTRATION; INTRACAUDAL; PERINEURAL
Status: DISCONTINUED | OUTPATIENT
Start: 2023-04-05 | End: 2023-04-05

## 2023-04-05 RX ORDER — DEXAMETHASONE SODIUM PHOSPHATE 4 MG/ML
INJECTION, SOLUTION INTRA-ARTICULAR; INTRALESIONAL; INTRAMUSCULAR; INTRAVENOUS; SOFT TISSUE
Status: DISCONTINUED | OUTPATIENT
Start: 2023-04-05 | End: 2023-04-05

## 2023-04-05 RX ORDER — METHOCARBAMOL 500 MG/1
1000 TABLET, FILM COATED ORAL EVERY 6 HOURS PRN
Status: DISCONTINUED | OUTPATIENT
Start: 2023-04-05 | End: 2023-04-05 | Stop reason: HOSPADM

## 2023-04-05 RX ORDER — FENTANYL CITRATE 50 UG/ML
INJECTION, SOLUTION INTRAMUSCULAR; INTRAVENOUS
Status: DISCONTINUED | OUTPATIENT
Start: 2023-04-05 | End: 2023-04-05

## 2023-04-05 RX ORDER — HYDROCODONE BITARTRATE AND ACETAMINOPHEN 5; 325 MG/1; MG/1
1 TABLET ORAL
Status: DISCONTINUED | OUTPATIENT
Start: 2023-04-05 | End: 2023-04-05 | Stop reason: HOSPADM

## 2023-04-05 RX ORDER — SODIUM CHLORIDE, SODIUM GLUCONATE, SODIUM ACETATE, POTASSIUM CHLORIDE AND MAGNESIUM CHLORIDE 30; 37; 368; 526; 502 MG/100ML; MG/100ML; MG/100ML; MG/100ML; MG/100ML
INJECTION, SOLUTION INTRAVENOUS CONTINUOUS
Status: DISCONTINUED | OUTPATIENT
Start: 2023-04-05 | End: 2023-04-05 | Stop reason: HOSPADM

## 2023-04-05 RX ORDER — MORPHINE SULFATE 4 MG/ML
3 INJECTION, SOLUTION INTRAMUSCULAR; INTRAVENOUS
Status: DISCONTINUED | OUTPATIENT
Start: 2023-04-05 | End: 2023-04-05 | Stop reason: HOSPADM

## 2023-04-05 RX ORDER — SODIUM CHLORIDE 9 MG/ML
INJECTION, SOLUTION INTRAVENOUS CONTINUOUS
Status: DISCONTINUED | OUTPATIENT
Start: 2023-04-05 | End: 2023-04-05 | Stop reason: HOSPADM

## 2023-04-05 RX ORDER — METOCLOPRAMIDE HYDROCHLORIDE 5 MG/ML
10 INJECTION INTRAMUSCULAR; INTRAVENOUS EVERY 6 HOURS PRN
Status: DISCONTINUED | OUTPATIENT
Start: 2023-04-05 | End: 2023-04-05 | Stop reason: HOSPADM

## 2023-04-05 RX ORDER — ONDANSETRON 4 MG/1
4 TABLET, ORALLY DISINTEGRATING ORAL
Status: COMPLETED | OUTPATIENT
Start: 2023-04-05 | End: 2023-04-05

## 2023-04-05 RX ORDER — CLINDAMYCIN PHOSPHATE 900 MG/50ML
900 INJECTION, SOLUTION INTRAVENOUS
Status: COMPLETED | OUTPATIENT
Start: 2023-04-05 | End: 2023-04-05

## 2023-04-05 RX ORDER — HYDROMORPHONE HYDROCHLORIDE 2 MG/ML
0.4 INJECTION, SOLUTION INTRAMUSCULAR; INTRAVENOUS; SUBCUTANEOUS EVERY 5 MIN PRN
Status: DISCONTINUED | OUTPATIENT
Start: 2023-04-05 | End: 2023-04-05 | Stop reason: HOSPADM

## 2023-04-05 RX ORDER — ONDANSETRON 2 MG/ML
INJECTION INTRAMUSCULAR; INTRAVENOUS
Status: DISCONTINUED | OUTPATIENT
Start: 2023-04-05 | End: 2023-04-05

## 2023-04-05 RX ORDER — KETOROLAC TROMETHAMINE 10 MG/1
10 TABLET, FILM COATED ORAL EVERY 8 HOURS
Qty: 15 TABLET | Refills: 0 | Status: SHIPPED | OUTPATIENT
Start: 2023-04-05 | End: 2023-04-10

## 2023-04-05 RX ORDER — PROPOFOL 10 MG/ML
VIAL (ML) INTRAVENOUS
Status: DISCONTINUED | OUTPATIENT
Start: 2023-04-05 | End: 2023-04-05

## 2023-04-05 RX ORDER — EPHEDRINE SULFATE 50 MG/ML
INJECTION, SOLUTION INTRAVENOUS
Status: DISCONTINUED | OUTPATIENT
Start: 2023-04-05 | End: 2023-04-05

## 2023-04-05 RX ORDER — CELECOXIB 200 MG/1
200 CAPSULE ORAL
Status: DISCONTINUED | OUTPATIENT
Start: 2023-04-05 | End: 2023-04-05 | Stop reason: HOSPADM

## 2023-04-05 RX ORDER — ACETAMINOPHEN 500 MG
1000 TABLET ORAL
Status: COMPLETED | OUTPATIENT
Start: 2023-04-05 | End: 2023-04-05

## 2023-04-05 RX ORDER — SODIUM CHLORIDE, SODIUM LACTATE, POTASSIUM CHLORIDE, CALCIUM CHLORIDE 600; 310; 30; 20 MG/100ML; MG/100ML; MG/100ML; MG/100ML
1000 INJECTION, SOLUTION INTRAVENOUS ONCE
Status: CANCELLED | OUTPATIENT
Start: 2023-04-05 | End: 2023-04-05

## 2023-04-05 RX ORDER — GABAPENTIN 300 MG/1
300 CAPSULE ORAL
Status: COMPLETED | OUTPATIENT
Start: 2023-04-05 | End: 2023-04-05

## 2023-04-05 RX ORDER — ACETAMINOPHEN 500 MG
1000 TABLET ORAL
Status: CANCELLED | OUTPATIENT
Start: 2023-04-05

## 2023-04-05 RX ORDER — ROPIVACAINE HYDROCHLORIDE 5 MG/ML
INJECTION, SOLUTION EPIDURAL; INFILTRATION; PERINEURAL
Status: COMPLETED
Start: 2023-04-05 | End: 2023-04-05

## 2023-04-05 RX ORDER — MEPERIDINE HYDROCHLORIDE 25 MG/ML
6.25 INJECTION INTRAMUSCULAR; INTRAVENOUS; SUBCUTANEOUS ONCE AS NEEDED
Status: DISCONTINUED | OUTPATIENT
Start: 2023-04-05 | End: 2023-04-05 | Stop reason: HOSPADM

## 2023-04-05 RX ORDER — ACETAMINOPHEN 500 MG
1000 TABLET ORAL EVERY 8 HOURS PRN
Qty: 50 TABLET | Refills: 0 | Status: SHIPPED | OUTPATIENT
Start: 2023-04-05

## 2023-04-05 RX ORDER — SCOLOPAMINE TRANSDERMAL SYSTEM 1 MG/1
1 PATCH, EXTENDED RELEASE TRANSDERMAL ONCE AS NEEDED
Status: DISCONTINUED | OUTPATIENT
Start: 2023-04-05 | End: 2023-04-05 | Stop reason: HOSPADM

## 2023-04-05 RX ORDER — ROPIVACAINE HYDROCHLORIDE 5 MG/ML
INJECTION, SOLUTION EPIDURAL; INFILTRATION; PERINEURAL
Status: COMPLETED | OUTPATIENT
Start: 2023-04-05 | End: 2023-04-05

## 2023-04-05 RX ORDER — METHOCARBAMOL 750 MG/1
750 TABLET, FILM COATED ORAL EVERY 6 HOURS
Qty: 56 TABLET | Refills: 0 | Status: SHIPPED | OUTPATIENT
Start: 2023-04-05 | End: 2023-04-19

## 2023-04-05 RX ORDER — GABAPENTIN 300 MG/1
300 CAPSULE ORAL EVERY 8 HOURS
Qty: 15 CAPSULE | Refills: 0 | Status: SHIPPED | OUTPATIENT
Start: 2023-04-05 | End: 2023-04-10

## 2023-04-05 RX ORDER — ASPIRIN 81 MG/1
81 TABLET ORAL 2 TIMES DAILY
Qty: 60 TABLET | Refills: 0 | Status: SHIPPED | OUTPATIENT
Start: 2023-04-05 | End: 2023-05-05

## 2023-04-05 RX ORDER — ONDANSETRON 2 MG/ML
4 INJECTION INTRAMUSCULAR; INTRAVENOUS EVERY 6 HOURS PRN
Status: DISCONTINUED | OUTPATIENT
Start: 2023-04-05 | End: 2023-04-05 | Stop reason: HOSPADM

## 2023-04-05 RX ORDER — HYDROCODONE BITARTRATE AND ACETAMINOPHEN 5; 325 MG/1; MG/1
1 TABLET ORAL EVERY 4 HOURS PRN
Status: DISCONTINUED | OUTPATIENT
Start: 2023-04-05 | End: 2023-04-05 | Stop reason: HOSPADM

## 2023-04-05 RX ORDER — ROCURONIUM BROMIDE 10 MG/ML
INJECTION, SOLUTION INTRAVENOUS
Status: DISCONTINUED | OUTPATIENT
Start: 2023-04-05 | End: 2023-04-05

## 2023-04-05 RX ORDER — GABAPENTIN 300 MG/1
300 CAPSULE ORAL
Status: CANCELLED | OUTPATIENT
Start: 2023-04-05

## 2023-04-05 RX ADMIN — ONDANSETRON 4 MG: 4 TABLET, ORALLY DISINTEGRATING ORAL at 07:04

## 2023-04-05 RX ADMIN — PHENYLEPHRINE HYDROCHLORIDE 100 MCG: 10 INJECTION INTRAVENOUS at 10:04

## 2023-04-05 RX ADMIN — ROPIVACAINE HYDROCHLORIDE 30 ML: 5 INJECTION, SOLUTION EPIDURAL; INFILTRATION; PERINEURAL at 10:04

## 2023-04-05 RX ADMIN — PHENYLEPHRINE HYDROCHLORIDE 100 MCG: 10 INJECTION INTRAVENOUS at 11:04

## 2023-04-05 RX ADMIN — FENTANYL CITRATE 50 MCG: 50 INJECTION, SOLUTION INTRAMUSCULAR; INTRAVENOUS at 10:04

## 2023-04-05 RX ADMIN — PROPOFOL 150 MG: 10 INJECTION, EMULSION INTRAVENOUS at 10:04

## 2023-04-05 RX ADMIN — ROCURONIUM BROMIDE 50 MG: 10 SOLUTION INTRAVENOUS at 10:04

## 2023-04-05 RX ADMIN — CELECOXIB 200 MG: 200 CAPSULE ORAL at 07:04

## 2023-04-05 RX ADMIN — DEXAMETHASONE SODIUM PHOSPHATE 4 MG: 4 INJECTION, SOLUTION INTRA-ARTICULAR; INTRALESIONAL; INTRAMUSCULAR; INTRAVENOUS; SOFT TISSUE at 10:04

## 2023-04-05 RX ADMIN — GABAPENTIN 300 MG: 300 CAPSULE ORAL at 07:04

## 2023-04-05 RX ADMIN — CLINDAMYCIN PHOSPHATE 900 MG: 900 INJECTION, SOLUTION INTRAVENOUS at 10:04

## 2023-04-05 RX ADMIN — FENTANYL CITRATE 50 MCG: 50 INJECTION, SOLUTION INTRAMUSCULAR; INTRAVENOUS at 11:04

## 2023-04-05 RX ADMIN — NORETHINDRONE AND ETHINYL ESTRADIOL 5 MG: KIT ORAL at 11:04

## 2023-04-05 RX ADMIN — ACETAMINOPHEN 1000 MG: 500 TABLET ORAL at 07:04

## 2023-04-05 RX ADMIN — ONDANSETRON HYDROCHLORIDE 4 MG: 2 SOLUTION INTRAMUSCULAR; INTRAVENOUS at 11:04

## 2023-04-05 RX ADMIN — PHENYLEPHRINE HYDROCHLORIDE 0.32 MCG/KG/MIN: 10 INJECTION INTRAVENOUS at 11:04

## 2023-04-05 RX ADMIN — MIDAZOLAM HYDROCHLORIDE 2 MG: 1 INJECTION, SOLUTION INTRAMUSCULAR; INTRAVENOUS at 10:04

## 2023-04-05 RX ADMIN — SODIUM CHLORIDE, SODIUM GLUCONATE, SODIUM ACETATE, POTASSIUM CHLORIDE AND MAGNESIUM CHLORIDE: 526; 502; 368; 37; 30 INJECTION, SOLUTION INTRAVENOUS at 10:04

## 2023-04-05 RX ADMIN — NORETHINDRONE AND ETHINYL ESTRADIOL 25 MG: KIT ORAL at 11:04

## 2023-04-05 RX ADMIN — SUGAMMADEX 200 MG: 100 INJECTION, SOLUTION INTRAVENOUS at 11:04

## 2023-04-05 RX ADMIN — NORETHINDRONE AND ETHINYL ESTRADIOL 20 MG: KIT ORAL at 11:04

## 2023-04-05 RX ADMIN — LIDOCAINE HYDROCHLORIDE 5 ML: 20 INJECTION, SOLUTION EPIDURAL; INFILTRATION; INTRACAUDAL; PERINEURAL at 10:04

## 2023-04-05 RX ADMIN — ROCURONIUM BROMIDE 10 MG: 10 SOLUTION INTRAVENOUS at 11:04

## 2023-04-05 RX ADMIN — PROPOFOL 50 MG: 10 INJECTION, EMULSION INTRAVENOUS at 11:04

## 2023-04-05 NOTE — ANESTHESIA PROCEDURE NOTES
Intubation    Date/Time: 4/5/2023 10:34 AM  Performed by: Elder Finnegan CRNA  Authorized by: Belkis Campbell MD     Intubation:     Induction:  Intravenous    Intubated:  Postinduction    Mask Ventilation:  Easy mask    Attempts:  1    Attempted By:  CRNA    Method of Intubation:  Direct    Blade:  Maye 3    Laryngeal View Grade: Grade I - full view of cords      Difficult Airway Encountered?: No      Complications:  None    Airway Device:  Oral endotracheal tube    Airway Device Size:  7.5    Style/Cuff Inflation:  Cuffed (inflated to minimal occlusive pressure)    Tube secured:  21    Secured at:  The lips    Placement Verified By:  Capnometry    Complicating Factors:  None    Findings Post-Intubation:  BS equal bilateral and atraumatic/condition of teeth unchanged

## 2023-04-05 NOTE — ANESTHESIA PROCEDURE NOTES
Peripheral Block    Patient location during procedure: pre-op   Block not for primary anesthetic.  Reason for block: at surgeon's request and post-op pain management   Post-op Pain Location: Shoulder   Start time: 4/5/2023 10:20 AM  Timeout: 4/5/2023 10:19 AM   End time: 4/5/2023 10:21 AM    Staffing  Authorizing Provider: Belkis Campbell MD  Performing Provider: Belkis Campbell MD    Preanesthetic Checklist  Completed: patient identified, IV checked, site marked, risks and benefits discussed, surgical consent, monitors and equipment checked, pre-op evaluation and timeout performed  Peripheral Block  Patient position: supine  Prep: ChloraPrep  Patient monitoring: heart rate, cardiac monitor, continuous pulse ox, continuous capnometry and frequent blood pressure checks  Block type: supraclavicular  Laterality: left  Injection technique: single shot  Needle  Needle type: Stimuplex   Needle gauge: 22 G  Needle length: 2 in  Needle localization: anatomical landmarks and ultrasound guidance   -ultrasound image captured on disc.  Assessment  Injection assessment: negative aspiration, negative parasthesia and local visualized surrounding nerve  Paresthesia pain: none  Heart rate change: no  Slow fractionated injection: yes  Pain Tolerance: comfortable throughout block and no complaints  Medications:    Medications: ropivacaine (NAROPIN) injection 0.5% - Perineural   30 mL - 4/5/2023 10:21:00 AM    Additional Notes  VSS.  RN monitoring vitals throughout procedure.  Patient tolerated procedure well.      IV Sedation used and titrated for patient comfort-See MAR  Ultrasound guidance used to visualize the nerve bundle and sheath as well as confirm needle placement and deposition of the local anesthetic.  (1) Under ultrasound guidance, needle was inserted and placed in close proximity to the nerve bundle  (2) Ultrasound was also used to visualize the spread of the anesthetic in close proximity to the brachial plexus  (3)  The nerves appeared anatomically normal  (4) There were no apparent abnormal pathological findings    Ultrasound photos archived.  Pt tolerated procedure well. There were no immediate complications.

## 2023-04-05 NOTE — OR NURSING
10:16  TIMEOUT DONE    10:20  DR. BOYD WILL ATTEMPT TO DO A LEFT SUPRACLAVICULAR NERVE BLOCK.    10:21  BLOCK COMPLETED AND TOLERATED WELL.

## 2023-04-05 NOTE — PATIENT INSTRUCTIONS
Discharge Instructions  Juanpablo Bourgeois MD  [address], [address]  [place] [address]  [phone]  Instructions for After Surgery:  Follow up:  -Postoperative follow up evaluation should be scheduled for 2 weeks postoperatively.  -Physical therapy referral has been routed to the clinic of your choice.  That clinic will contact you to schedule your first postoperative appointment.  Please notify our office if the PT clinic has not contacted you within 5 days.    Prescriptions:  -All prescriptions have been printed and will be made available to you at discharge.  -See multi-modal pain protocol instructions.  -While taking anti-inflammatories (ketorolac (Toradol) and meloxicam (Mobic)), avoid all other over-the-counter anti-inflammatories (ex. Advil, Aleve, ibuprofen, Motrin, naproxen, etc.)  -Discontinue indomethacin while taking ketorolac (Toradol) and meloxicam (Mobic). Resume indomethacin after completion of these prescriptions.  -While taking anti-inflammatories, you may want to take a medication to protect your stomach (ex. omeprazole, Prilosec, Prevacid, Pepcid, Nexium, etc.)  -Take anti-inflammatories with food and discontinue if GI upset occurs.  -The narcotic prescription Oxycodone and gabapentin (Neurontin) may cause sedation, do NOT drive while taking these medications.    Diet:  -The first meal at home should be a liquid meal (ex. Soup and/or juices) to prevent postoperative nausea.   -Advance to normal diet as tolerated if no postoperative nausea.  -Take prescribed ondansetron (Zofran) as needed for any postoperative nausea and vomiting.    Constipation Prevention:  -Take Miralax or Senakot S/Reina-Colace and stool softeners DAILY while taking pain medications.  -Increase water and fiber intake.  -Move around as much as possible.  -Use other, more aggressive, over-the-counter laxatives as needed for constipation (i.e. Milk of Magnesia, Dulcolax tablet/suppository, Magnesium citrate, Fleet's  enema, etc.)    Wound Care:  -Remove dressing after 7 days ? there will be a lot of drainage on your dressing, which is normal.  -Do NOT remove Steri-Strips ? apply a gauze/tape dressing or Band-Aid over your Steri-Strips until follow up.  -Change dressing daily following initial dressing change.  -Once the incision has no drainage, you no longer need a dressing.  -It is okay to take a shower/lightly run water over the wound AFTER daily dressing changes have been discontinued.  -Do NOT submerge the wound in water ? it is okay to take a sponge bath, use waterproof bandages, or place a sealed plastic bag over the operative extremity.    Bracing:  -Abduction sling MUST be worn at all times, except when washing your body, changing clothes, or participating in physical therapy.  -Do NOT adjust settings on your brace.  -It is okay to adjust/loosen the tightness of the Abduction sling.    Blood Clot Prevention:  -Take Aspirin 81 mg twice daily for 4 weeks postoperatively.  -Get up and walk around as much as possible.  -Utilize compression hose for 14 days postoperatively to assist with any lower extremity swelling.    Urinary Retention:  If you start having difficulty urinating, decrease Oxycodone and Robaxin and call your primary care doctor or urologist.     Pneumonia Prevention:  Stay out of bed as much as possible, walk around at least every 2 hours and continue breathing exercises as long as you are limited in mobility and on narcotics.     Ice (cryotherapy):  -Cryotherapy (i.e. cold therapy unit or ice bags) has been clinically proven to reduce pain and help control swelling postoperatively.  -Apply cryotherapy for 30 minutes/session with at least a 10-minute break to prevent cold burn injuries.  -Cryotherapy is most beneficial in the first 48-72 hours postoperatively, and can be performed as needed after that.  -Cryotherapy can be used as needed for periodic pain/swelling episodes several weeks  postoperatively.    Other Instructions:  -Keep extremity elevated (i.e. above the level of the heart) as much as possible to prevent swelling and reduce pain.  -Non weight bearing to the operative extremity   -If you had a nerve block, take your Oxycodone BEFORE your pain becomes too severe.  -Utilize [employer] celso on your smart phone to record and track medications listed below            [facility]  [address], [place] [address]  Phone [phone]/ Fax [phone]  Multimodal Pain Management Instructions  Postoperative regimen:          Days 1-5:     Toradol/Ketorolac (anti-inflammatory) - take 10mg every 8 hours, around the clock. (While on Toradol, take a medication to protect your stomach, such as Omeprazole, Prilosec, Prevacid, Pepcid, Nexium....etc).     Robaxin/Methocarbamol (muscle relaxer) 750mg every 6 hours, around the clock for muscle spasms, thigh pain and stiffness, additional pain control. This medication is helpful for pain control while lessening your need for narcotics.    Neurontin/Gabapentin (neuropathic/shocking/ nerve like pain) 300mg every 8 hours - if you get too sleepy during the day, switch to nightly dosing or discontinue the use completely.     Tylenol/Acetaminophen (Pain Pill) 1000mg every 8 hours, around the clock. **NO MORE THAN 3000mg OF TYLENOL IN 24 HOURS**.    *Try to rotate these medications and not take them all at the same time, this will improve your overall pain control*         Days 6-14:    Robaxin/Methocarbamol 750mg every 6 hours  Tylenol 1000mg every 8 hours  Mobic/Meloxicam 7.5 twice a day          Days 15 and beyond:    Tylenol 1000mg three times a day, as needed  Mobic/Meloxicam 7.5mg Daily (optional, AS NEEDED)    *Oxycodone 5 mg (Opioid Narcotic) can be used twice daily for severe breakthrough pain ONLY*  Gradually reduce the use as the pain lessens.    Utilize [employer] celso on your smart phone to  record and track medications

## 2023-04-05 NOTE — PLAN OF CARE
Preparing patient for discharge. Vital signs stable. Patient reports no pain. Patient and family members verbalize understanding of discharge instructions.

## 2023-04-05 NOTE — TRANSFER OF CARE
"Anesthesia Transfer of Care Note    Patient: Sixto Joseph    Procedure(s) Performed: Procedure(s) (LRB):  REPAIR, ROTATOR CUFF, ARTHROSCOPIC Arthrex, lateral shoulder traction, bean bag, block (Left)  ARTHROSCOPY,SHOULDER,WITH BICEPS TENODESIS (Left)  ARTHROSCOPY, SHOULDER, WITH DISTAL CLAVICLE EXCISION (Left)    Patient location: PACU    Anesthesia Type: general    Transport from OR: Transported from OR on room air with adequate spontaneous ventilation    Post pain: adequate analgesia    Post assessment: no apparent anesthetic complications    Post vital signs: stable    Level of consciousness: sedated    Nausea/Vomiting: no nausea/vomiting    Complications: none    Transfer of care protocol was followed      Last vitals:   Visit Vitals  BP (!) 96/54   Pulse 88   Temp 36.2 °C (97.2 °F) (Tympanic)   Resp 18   Ht 5' 10" (1.778 m)   Wt 93 kg (205 lb 0.4 oz)   SpO2 (!) 93%   BMI 29.42 kg/m²     "

## 2023-04-05 NOTE — ANESTHESIA PREPROCEDURE EVALUATION
04/05/2023  Sixto Joseph is a 67 y.o., male with ----------------------------  Arthritis  Circumcision complication  Cortical cataract of both eyes  Digestive disorder  Glaucoma  Gout  Type 2 diabetes mellitus without complication, without long-term   current use of insulin    And ----------------------------  Carpal tunnel release  Cataract extraction w/  intraocular lens implant  Esophagus surgery      Comment:  dilation  Gastroscopy      Comment:  with botox injection  Wrist surgery      Comment:  ORIF  Presents for left rotator cuff surgery.      Pre-op Assessment    I have reviewed the NPO Status.      Review of Systems      Physical Exam  General: Well nourished, Cooperative, Alert and Oriented    Airway:  Mallampati: II   Mouth Opening: Normal  TM Distance: Normal  Tongue: Normal  Neck ROM: Normal ROM    Dental:  Intact    Chest/Lungs:  Clear to auscultation, Normal Respiratory Rate    Heart:  Rate: Normal  Rhythm: Regular Rhythm        Anesthesia Plan  Type of Anesthesia, risks & benefits discussed:    Anesthesia Type: Regional, Gen ETT  Intra-op Monitoring Plan: Standard ASA Monitors  Post Op Pain Control Plan: multimodal analgesia, peripheral nerve block and IV/PO Opioids PRN  Induction:  IV  Informed Consent: Informed consent signed with the Patient and all parties understand the risks and agree with anesthesia plan.  All questions answered.   ASA Score: 3  Day of Surgery Review of History & Physical: H&P Update referred to the surgeon/provider.  Anesthesia Plan Notes: Premedication: Midazolam  Regional: Supraclavicular vs interscalene nerve block for postop pain control as requested by Dr Montiel__- Ropiv 0.5% 30mls  Special Technique: Preemptive analgesia with neurontin, zofran, acetaminophen and celebrex or tramadol  GETA   PONV prophylaxis    Ready For Surgery From Anesthesia Perspective.      .

## 2023-04-05 NOTE — OP NOTE
04/05/2023    PRIMARY SURGEON: Juanpablo Bourgeois MD    ASSISTANT: Ondina Silveira NP was imperative to the critical parts of the surgical case.  This included the surgical approach and dissection, retraction, placement of hardware and implants, and closure.    PREOPERATIVE DIAGNOSIS:  1.  Left rotator cuff tear  2.  Acromioclavicular arthrosis    POSTOPERATIVE DIAGNOSIS:  1.  Left rotator cuff tear  2.  Acromioclavicular arthrosis  3.  Intra-articular biceps tendon tear and medial subluxation    PROCEDURES:  1.  Left diagnostic shoulder arthroscopy  2.  Arthroscopic biceps tenotomy  3.  Distal clavicle resection  4.  Arthroscopic rotator cuff repair with PRP (platelet rich plasma) injection    ANESTHESIA:  General anesthesia with supraclavicular nerve block    BLOOD LOSS:  Less than 10 cc    DVT PROPHYLAXIS:  Aspirin for 4-6 weeks    OUTCOME:  Patient tolerated treatment/procedure well without complications and is now ready for discharge.    DISPOSITION: Home/SelfCare    FOLLOW UP: In clinic    INSTRUMENTATION:  Rotator cuff repair: Arthrex FiberTak anchors x 2 for medial row repair, Arthrex 5.5 mm swivel lock anchors x2 for lateral row repair  Implant Name Type Inv. Item Serial No.  Lot No. LRB No. Used Action   2.6 fibertak    ARTHREX [id number] Left 1 Implanted   2.6 fibertak    [employer]X [id number] Left 1 Implanted   ANCHOR SUT 5.5X19.1MM SL - [id number]  ANCHOR SUT 5.5X19.1MM SL  ARTHREX [id number] Left 1 Implanted   ANCHOR SUT 5.5X19.1MM SL - [id number]  ANCHOR SUT 5.5X19.1MM SL  ARTHREX [id number] Left 1 Implanted       PROCEDURE IN DETAIL:    Diagnostic arthroscopy of shoulder    The examination under anesthesia was performed for skin defects and other contraindications and none were found.The patient was carefully placed in a lateral decubitus position. All bony prominences were padded and sterile U-drape was applied. Patients operative extremity was placed in suspension device with 7-10lbs of weight  applied. The skin was prepped in normal sterile fashion. A time out was performed to confirm correct operative extremity, allergies, and antibiotic infusion. All portals were made with an 11-blade and an 18-gauge spinal needle was used to help probe and find proper alignment for the portals. After creating posterior portal, an arthroscope was inserted into the glenohumeral joint. A diagnostic arthroscopy was then performed in this sequential order: biceps anchor, rotator interval, subscapularis tendon, superior glenohumeral ligament, middle glenohumeral ligament, inferior glenohumeral ligament, anterior and inferior capsular attachments, anterior, inferior, and posterior labrum, teres minor tendon, infraspinatus tendon, and supraspinatus tendon, and biceps tendon in the rotator interval.    The certified assistant provided critical assistance by holding instruments including the arthroscope to provide adequate visualization of the procedure, as well as assisting in wound closure.    At the end of the procedure the portals were closed with mastisol around the wound and placement of steri-strips. The patient tolerated the procedure well and taken to the recovery room in good condition.      Arthroscopic biceps Tenotomy    Viewing through posterior portal, the base of the biceps tendon visualized, subluxed and also had intra tendinous and partial tearing. The tendon was transected with arthroscopic scissors through the anterior portal.      Distal clavicle resection    A distal clavicle resection was then carried out. Viewing from the posterior portal, the shaver was used to debride the acromioclavicular joint and associated soft tissues through the anterior portal. Once cleared of the soft tissue, the avery was used to resect the distal end of the clavicle concentrating initially upon the inferior and distal clavicular spurs. The resection was then carried superiorly to remove the upper end of the clavicle to remove  approximately 5-7mm of spurs. Then the avery was used on the acromion to remove 2-3mm of bone. The resection was then measured with a probe to confirm the 8-10mm of bone was resected.       Arthroscopic rotator cuff repair with PRP    Viewing from the posterior portal we can visualize the rotator cuff tear laterally.  The greater tuberosity attachment for the rotator cuff was debrided to good bony base.  Anchors were placed just lateral to the articular surface of the humeral head on the greater tuberosity near the rotator cuff attachment.  Sutures were then passed through the rotator cuff just medial to the rotator cuff musculo-tendonis interval.  Next the lateral row anchors were placed with the medial row anchors affixed to them.  This completed the repair.  There was good reapproximation and repair of the rotator cuff to the footprint.    I then placed an 18-gauge spinal needle into the repair site.  I removed all of the inflow and outflow tubings and injected platelet rich plasma into the repair site.

## 2023-04-06 VITALS
HEIGHT: 70 IN | SYSTOLIC BLOOD PRESSURE: 142 MMHG | WEIGHT: 205 LBS | HEART RATE: 62 BPM | RESPIRATION RATE: 17 BRPM | DIASTOLIC BLOOD PRESSURE: 88 MMHG | OXYGEN SATURATION: 94 % | TEMPERATURE: 97 F | BODY MASS INDEX: 29.35 KG/M2

## 2023-04-06 NOTE — ANESTHESIA POSTPROCEDURE EVALUATION
Anesthesia Post Evaluation    Patient: Sixto Joseph    Procedure(s) Performed: Procedure(s) (LRB):  REPAIR, ROTATOR CUFF, ARTHROSCOPIC Arthrex, lateral shoulder traction, bean bag, block (Left)  ARTHROSCOPY,SHOULDER,WITH BICEPS TENODESIS (Left)  ARTHROSCOPY, SHOULDER, WITH DISTAL CLAVICLE EXCISION (Left)    Final Anesthesia Type: general      Patient location during evaluation: PACU  Patient participation: Yes- Able to Participate  Level of consciousness: awake and alert  Post-procedure vital signs: reviewed and stable  Pain management: adequate (block working well)  Airway patency: patent    PONV status at discharge: No PONV  Anesthetic complications: no      Cardiovascular status: hemodynamically stable  Respiratory status: unassisted  Hydration status: euvolemic  Follow-up not needed.          Vitals Value Taken Time   /94 04/05/23 1421   Temp ** 04/06/23 1004   Pulse 63 04/05/23 1421   Resp 17 04/05/23 1416   SpO2 93 % 04/05/23 1421   Vitals shown include unvalidated device data.      Event Time   Out of Recovery 13:01:07         Pain/Demario Score: Pain Rating Prior to Med Admin: 10 (4/5/2023  7:51 AM)  Demario Score: 10 (4/5/2023 12:47 PM)  Modified Demario Score: 19 (4/5/2023  2:25 PM)

## 2023-04-20 ENCOUNTER — OFFICE VISIT (OUTPATIENT)
Dept: ORTHOPEDICS | Facility: CLINIC | Age: 68
End: 2023-04-20
Payer: MEDICARE

## 2023-04-20 DIAGNOSIS — Z98.890 STATUS POST LEFT ROTATOR CUFF REPAIR: Primary | ICD-10-CM

## 2023-04-20 PROCEDURE — 1101F PT FALLS ASSESS-DOCD LE1/YR: CPT | Mod: CPTII,,, | Performed by: ORTHOPAEDIC SURGERY

## 2023-04-20 PROCEDURE — 3288F PR FALLS RISK ASSESSMENT DOCUMENTED: ICD-10-PCS | Mod: CPTII,,, | Performed by: ORTHOPAEDIC SURGERY

## 2023-04-20 PROCEDURE — 99024 PR POST-OP FOLLOW-UP VISIT: ICD-10-PCS | Mod: ,,, | Performed by: ORTHOPAEDIC SURGERY

## 2023-04-20 PROCEDURE — 1159F PR MEDICATION LIST DOCUMENTED IN MEDICAL RECORD: ICD-10-PCS | Mod: CPTII,,, | Performed by: ORTHOPAEDIC SURGERY

## 2023-04-20 PROCEDURE — 1160F PR REVIEW ALL MEDS BY PRESCRIBER/CLIN PHARMACIST DOCUMENTED: ICD-10-PCS | Mod: CPTII,,, | Performed by: ORTHOPAEDIC SURGERY

## 2023-04-20 PROCEDURE — 1159F MED LIST DOCD IN RCRD: CPT | Mod: CPTII,,, | Performed by: ORTHOPAEDIC SURGERY

## 2023-04-20 PROCEDURE — 99024 POSTOP FOLLOW-UP VISIT: CPT | Mod: ,,, | Performed by: ORTHOPAEDIC SURGERY

## 2023-04-20 PROCEDURE — 1101F PR PT FALLS ASSESS DOC 0-1 FALLS W/OUT INJ PAST YR: ICD-10-PCS | Mod: CPTII,,, | Performed by: ORTHOPAEDIC SURGERY

## 2023-04-20 PROCEDURE — 1160F RVW MEDS BY RX/DR IN RCRD: CPT | Mod: CPTII,,, | Performed by: ORTHOPAEDIC SURGERY

## 2023-04-20 PROCEDURE — 3288F FALL RISK ASSESSMENT DOCD: CPT | Mod: CPTII,,, | Performed by: ORTHOPAEDIC SURGERY

## 2023-04-20 NOTE — PROGRESS NOTES
Orthopaedic Clinic  Orthopedic Clinic Note      Chief Complaint:   Chief Complaint   Patient presents with    Post-op Evaluation     2wk sp ATS L shoulder w/ rc repair, global 4/5/23-7/4/23. pt states no pain until started going to PT which is normal he states. generally no pain just has issues sleeping at night.     Referring Physician: No ref. provider found      History of Present Illness:    04/05/2023 PROCEDURES:  1.  Left diagnostic shoulder arthroscopy  2.  Arthroscopic biceps tenotomy  3.  Distal clavicle resection  4.  Arthroscopic rotator cuff repair with PRP (platelet rich plasma) injection  04/20/2023 Patient presents 2 weeks status post the above noted procedure.  He is doing well with no major issues or concerns.  Participating in formal physical therapy.  Compliant with abduction sling.      Past Medical History:   Diagnosis Date    Arthritis     Circumcision complication     Cortical cataract of both eyes 10/19/2015    Digestive disorder     Glaucoma     Gout     Type 2 diabetes mellitus without complication, without long-term current use of insulin 02/04/2019       Past Surgical History:   Procedure Laterality Date    ARTHROSCOPIC REPAIR OF ROTATOR CUFF OF SHOULDER Left 4/5/2023    Procedure: REPAIR, ROTATOR CUFF, ARTHROSCOPIC Arthrex, lateral shoulder traction, bean bag, block;  Surgeon: Juanpablo Bourgeois MD;  [facility];  Service: Orthopedics;  Laterality: Left;  Arthrex, lateral shoulder traction, bean bag, block    ARTHROSCOPY OF SHOULDER WITH REMOVAL OF DISTAL CLAVICLE Left 4/5/2023    Procedure: ARTHROSCOPY, SHOULDER, WITH DISTAL CLAVICLE EXCISION;  Surgeon: Juanpablo Bourgeois MD;  Location: [facility] OR;  Service: Orthopedics;  Laterality: Left;    ARTHROSCOPY,SHOULDER,WITH BICEPS TENODESIS Left 4/5/2023    Procedure: ARTHROSCOPY,SHOULDER,WITH BICEPS TENODESIS;  Surgeon: Juanpablo Bourgeois MD;  Location: [facility] OR;  Service: Orthopedics;  Laterality: Left;    CARPAL TUNNEL RELEASE Bilateral     CATARACT  EXTRACTION W/  INTRAOCULAR LENS IMPLANT Bilateral     ESOPHAGUS SURGERY      dilation    GASTROSCOPY      with botox injection    WRIST SURGERY Bilateral     ORIF       Current Outpatient Medications   Medication Sig    acetaminophen (TYLENOL) 500 MG tablet Take 2 tablets (1,000 mg total) by mouth every 8 (eight) hours as needed for Pain.    allopurinol (ZYLOPRIM) 300 MG tablet Take 450 mg by mouth nightly.    aspirin (ECOTRIN) 81 MG EC tablet Take 1 tablet (81 mg total) by mouth 2 (two) times a day.    atorvastatin (LIPITOR) 20 MG tablet Take 20 mg by mouth every evening.    b complex vitamins capsule Take 1 capsule by mouth once daily.    cetirizine (ZYRTEC) 5 MG tablet Take 5 mg by mouth once daily.    fenofibrate 160 MG Tab Take 160 mg by mouth nightly.    JANUVIA 100 mg Tab Take 100 mg by mouth nightly.    JARDIANCE 10 mg Tab Take 10 mg by mouth nightly.    meloxicam (MOBIC) 7.5 MG tablet Starting on Post-op Day 6, take Mobic 7.5mg twice a day for 9 days. After 9 days, take Daily, AS NEEDED for pain    multivitamin (THERAGRAN) per tablet Take 1 tablet by mouth once daily.    pantoprazole (PROTONIX) 40 MG tablet Take 40 mg by mouth nightly.    tamsulosin (FLOMAX) 0.4 mg Cap Take 1 tablet by mouth nightly.    timolol maleate 0.5% (TIMOPTIC) 0.5 % Drop Place 1 drop into both eyes once daily.    vit A/vit C/vit E/zinc/copper (ICAPS AREDS ORAL) Take 1 tablet by mouth Daily.    vitamin E 100 UNIT capsule Take 100 Units by mouth once daily.    gabapentin (NEURONTIN) 300 MG capsule Take 1 capsule (300 mg total) by mouth every 8 (eight) hours. for 5 days     No current facility-administered medications for this visit.       Review of patient's allergies indicates:   Allergen Reactions    Diph,pertuss(acel),tet vac(pf) Anaphylaxis    Azithromycin Hives    Penicillin g potassium Other (See Comments)    E-mycin [erythromycin] Rash       Family History   Problem Relation Age of Onset    Glaucoma Sister     Glaucoma Maternal  Grandmother        Social History     Socioeconomic History    Marital status: Single   Tobacco Use    Smoking status: Former     Types: Cigars    Smokeless tobacco: Never    Tobacco comments:     marijuana   Substance and Sexual Activity    Alcohol use: Yes     Alcohol/week: 1.0 standard drink     Types: 1 Cans of beer per week     Comment: rarely    Drug use: Not Currently    Sexual activity: Not Currently           Review of Systems:  All review of systems negative except for those stated in the HPI.    Examination:    Vital Signs:  There were no vitals filed for this visit.    There is no height or weight on file to calculate BMI.    Physical Examination:  General: Well-developed, well-nourished.  Neuro: Alert and oriented x 3.  Psych: Normal mood and affect.  Left upper extremity:  Incisions clean, dry, and intact. No signs of infection. Neurovascular intact distally. Sensation intact.      Assessment: Status post left rotator cuff repair        Plan: I reviewed the intraoperative arthroscopic videos with the patient today.  Patient will wear the sling with abduction pillow for another 2 weeks, and then just a sling for another 2 weeks. Patient will continue with outpatient physical therapy.  Continue previously prescribed medications as needed for pain.  He will return to clinic in 2-3 months for reevaluation.  He verbalized understanding of the plan of care with no further questions.    Juanpablo Bourgeois MD personally performed the services described in this documentation, including but not limited to patient's history, physical examination, and assessment and plan of care. All medical record entries made by REINA Jimenez were performed at his direction and in his presence. The medical record was reviewed and is accurate and complete.         Follow up in about 2 months (around 6/20/2023) for Reevaluation.      DISCLAIMER: This note may have been dictated using voice recognition software and may contain  grammatical errors.     NOTE: Consult report sent to referring provider via [employer] EMR.

## 2023-06-20 ENCOUNTER — OFFICE VISIT (OUTPATIENT)
Dept: ORTHOPEDICS | Facility: CLINIC | Age: 68
End: 2023-06-20
Payer: MEDICARE

## 2023-06-20 VITALS — WEIGHT: 205 LBS | HEIGHT: 70 IN | BODY MASS INDEX: 29.35 KG/M2

## 2023-06-20 DIAGNOSIS — Z98.890 STATUS POST LEFT ROTATOR CUFF REPAIR: Primary | ICD-10-CM

## 2023-06-20 PROCEDURE — 99024 PR POST-OP FOLLOW-UP VISIT: ICD-10-PCS | Mod: ,,, | Performed by: ORTHOPAEDIC SURGERY

## 2023-06-20 PROCEDURE — 1159F PR MEDICATION LIST DOCUMENTED IN MEDICAL RECORD: ICD-10-PCS | Mod: CPTII,,, | Performed by: ORTHOPAEDIC SURGERY

## 2023-06-20 PROCEDURE — 1160F RVW MEDS BY RX/DR IN RCRD: CPT | Mod: CPTII,,, | Performed by: ORTHOPAEDIC SURGERY

## 2023-06-20 PROCEDURE — 3008F BODY MASS INDEX DOCD: CPT | Mod: CPTII,,, | Performed by: ORTHOPAEDIC SURGERY

## 2023-06-20 PROCEDURE — 1159F MED LIST DOCD IN RCRD: CPT | Mod: CPTII,,, | Performed by: ORTHOPAEDIC SURGERY

## 2023-06-20 PROCEDURE — 99024 POSTOP FOLLOW-UP VISIT: CPT | Mod: ,,, | Performed by: ORTHOPAEDIC SURGERY

## 2023-06-20 PROCEDURE — 3008F PR BODY MASS INDEX (BMI) DOCUMENTED: ICD-10-PCS | Mod: CPTII,,, | Performed by: ORTHOPAEDIC SURGERY

## 2023-06-20 PROCEDURE — 1101F PR PT FALLS ASSESS DOC 0-1 FALLS W/OUT INJ PAST YR: ICD-10-PCS | Mod: CPTII,,, | Performed by: ORTHOPAEDIC SURGERY

## 2023-06-20 PROCEDURE — 3288F PR FALLS RISK ASSESSMENT DOCUMENTED: ICD-10-PCS | Mod: CPTII,,, | Performed by: ORTHOPAEDIC SURGERY

## 2023-06-20 PROCEDURE — 1160F PR REVIEW ALL MEDS BY PRESCRIBER/CLIN PHARMACIST DOCUMENTED: ICD-10-PCS | Mod: CPTII,,, | Performed by: ORTHOPAEDIC SURGERY

## 2023-06-20 PROCEDURE — 3288F FALL RISK ASSESSMENT DOCD: CPT | Mod: CPTII,,, | Performed by: ORTHOPAEDIC SURGERY

## 2023-06-20 PROCEDURE — 1101F PT FALLS ASSESS-DOCD LE1/YR: CPT | Mod: CPTII,,, | Performed by: ORTHOPAEDIC SURGERY

## 2023-06-20 NOTE — PROGRESS NOTES
[facility]  Orthopedic Clinic Note      Chief Complaint:   Chief Complaint   Patient presents with    Follow-up     11wk sp left shoulder w/ RTC repair 4/5/23-7/4/23. pt states he is doing great in PT and there is still a little bit of soreness and pain after PT but other than that doing great.     Referring Physician: No ref. provider found      History of Present Illness:    04/05/2023 PROCEDURES:  1.  Left diagnostic shoulder arthroscopy  2.  Arthroscopic biceps tenotomy  3.  Distal clavicle resection  4.  Arthroscopic rotator cuff repair with PRP (platelet rich plasma) injection  04/20/2023 Patient presents 2 weeks status post the above noted procedure.  He is doing well with no major issues or concerns.  Participating in formal physical therapy.  Compliant with abduction sling.  06/20/2023 this patient is approximately 3 months out from the above-noted procedure.  He is doing great no major issues.  He complains of some soreness periodically.  He is still in physical therapy.      Past Medical History:   Diagnosis Date    Arthritis     Circumcision complication     Cortical cataract of both eyes 10/19/2015    Digestive disorder     Glaucoma     Gout     Type 2 diabetes mellitus without complication, without long-term current use of insulin 02/04/2019       Past Surgical History:   Procedure Laterality Date    ARTHROSCOPIC REPAIR OF ROTATOR CUFF OF SHOULDER Left 4/5/2023    Procedure: REPAIR, ROTATOR CUFF, ARTHROSCOPIC Arthrex, lateral shoulder traction, bean bag, block;  Surgeon: Juanpablo Bourgeois MD;  Location: [facility];  Service: Orthopedics;  Laterality: Left;  Arthrex, lateral shoulder traction, bean bag, block    ARTHROSCOPY OF SHOULDER WITH REMOVAL OF DISTAL CLAVICLE Left 4/5/2023    Procedure: ARTHROSCOPY, SHOULDER, WITH DISTAL CLAVICLE EXCISION;  Surgeon: Juanpablo Bourgeois MD;  Location: [facility];  Service: Orthopedics;  Laterality: Left;    ARTHROSCOPY,SHOULDER,WITH BICEPS TENODESIS Left 4/5/2023     Procedure: ARTHROSCOPY,SHOULDER,WITH BICEPS TENODESIS;  Surgeon: Juanpablo Bourgeois MD;  Location: [facility];  Service: Orthopedics;  Laterality: Left;    CARPAL TUNNEL RELEASE Bilateral     CATARACT EXTRACTION W/  INTRAOCULAR LENS IMPLANT Bilateral     ESOPHAGUS SURGERY      dilation    GASTROSCOPY      with botox injection    WRIST SURGERY Bilateral     ORIF       Current Outpatient Medications   Medication Sig    acetaminophen (TYLENOL) 500 MG tablet Take 2 tablets (1,000 mg total) by mouth every 8 (eight) hours as needed for Pain.    allopurinol (ZYLOPRIM) 300 MG tablet Take 450 mg by mouth nightly.    atorvastatin (LIPITOR) 20 MG tablet Take 20 mg by mouth every evening.    b complex vitamins capsule Take 1 capsule by mouth once daily.    cetirizine (ZYRTEC) 5 MG tablet Take 5 mg by mouth once daily.    fenofibrate 160 MG Tab Take 160 mg by mouth nightly.    JANUVIA 100 mg Tab Take 100 mg by mouth nightly.    JARDIANCE 10 mg Tab Take 10 mg by mouth nightly.    multivitamin (THERAGRAN) per tablet Take 1 tablet by mouth once daily.    pantoprazole (PROTONIX) 40 MG tablet Take 40 mg by mouth nightly.    tamsulosin (FLOMAX) 0.4 mg Cap Take 1 tablet by mouth nightly.    timolol maleate 0.5% (TIMOPTIC) 0.5 % Drop Place 1 drop into both eyes once daily.    vit A/vit C/vit E/zinc/copper (ICAPS AREDS ORAL) Take 1 tablet by mouth Daily.    vitamin E 100 UNIT capsule Take 100 Units by mouth once daily.    aspirin (ECOTRIN) 81 MG EC tablet Take 1 tablet (81 mg total) by mouth 2 (two) times a day.    gabapentin (NEURONTIN) 300 MG capsule Take 1 capsule (300 mg total) by mouth every 8 (eight) hours. for 5 days     No current facility-administered medications for this visit.       Review of patient's allergies indicates:   Allergen Reactions    Diph,pertuss(acel),tet vac(pf) Anaphylaxis    Azithromycin Hives    Penicillin g potassium Other (See Comments)    E-mycin [erythromycin] Rash       Family History   Problem Relation Age of  "Onset    Glaucoma Sister     Glaucoma Maternal Grandmother        Social History     Socioeconomic History    Marital status: Single   Tobacco Use    Smoking status: Former     Types: Cigars    Smokeless tobacco: Never    Tobacco comments:     marijuana   Substance and Sexual Activity    Alcohol use: Yes     Alcohol/week: 1.0 standard drink     Types: 1 Cans of beer per week     Comment: rarely    Drug use: Not Currently    Sexual activity: Not Currently           Review of Systems:  All review of systems negative except for those stated in the HPI.    Examination:    Vital Signs:    Vitals:    06/20/23 0920   Weight: 93 kg (205 lb 0.4 oz)   Height: 5' 10" (1.778 m)       Body mass index is 29.42 kg/m².    Physical Examination:  General: Well-developed, well-nourished.  Neuro: Alert and oriented x 3.  Psych: Normal mood and affect.  Left Shoulder Exam:  No obvious deformity. No medial or lateral scapula winging. Forward flexion to 170 degrees and abduction to 170 degrees and external rotation 80 degrees is and internal rotation is 80 degrees. Negative empty can, Whipple, Drop Arm Test, Chakraborty, impingement, AC joint tenderness. Negative biceps  groove tenderness, Sutton´s, Yergason´s, Speed test. Negative Apprehension and Relocation test. 5/5 strength, normal skin appearance and palpable pulses distally. Sensibility normal.        Assessment: Status post left rotator cuff repair          Plan:  This patient will continue with his normal day-to-day activities.  I would like to give him another 4 weeks before fully release him without any restrictions.  I explained it to him.  He will come back to see me as needed. Patient acknowledges their understanding and agreement with the plan.    Juanpablo Bourgeois MD personally performed the services described in this documentation, including but not limited to patient's history, physical examination, and assessment and plan of care. All medical record entries made by Ondina Silveira, " APRN were performed at his direction and in his presence. The medical record was reviewed and is accurate and complete.         No follow-ups on file.      DISCLAIMER: This note may have been dictated using voice recognition software and may contain grammatical errors.     NOTE: Consult report sent to referring provider via [employer] EMR.

## 2023-08-21 ENCOUNTER — OFFICE VISIT (OUTPATIENT)
Dept: OPHTHALMOLOGY | Facility: CLINIC | Age: 68
End: 2023-08-21
Payer: MEDICARE

## 2023-08-21 DIAGNOSIS — H02.403 PTOSIS OF BOTH EYELIDS: ICD-10-CM

## 2023-08-21 DIAGNOSIS — E11.69 TYPE 2 DIABETES MELLITUS WITH OTHER SPECIFIED COMPLICATION, WITHOUT LONG-TERM CURRENT USE OF INSULIN: ICD-10-CM

## 2023-08-21 DIAGNOSIS — H40.1131 PRIMARY OPEN ANGLE GLAUCOMA OF BOTH EYES, MILD STAGE: Primary | ICD-10-CM

## 2023-08-21 PROCEDURE — 99999 PR PBB SHADOW E&M-EST. PATIENT-LVL III: ICD-10-PCS | Mod: PBBFAC,,, | Performed by: OPHTHALMOLOGY

## 2023-08-21 PROCEDURE — 1160F RVW MEDS BY RX/DR IN RCRD: CPT | Mod: CPTII,S$GLB,, | Performed by: OPHTHALMOLOGY

## 2023-08-21 PROCEDURE — 99214 PR OFFICE/OUTPT VISIT, EST, LEVL IV, 30-39 MIN: ICD-10-PCS | Mod: S$GLB,,, | Performed by: OPHTHALMOLOGY

## 2023-08-21 PROCEDURE — 99999 PR PBB SHADOW E&M-EST. PATIENT-LVL III: CPT | Mod: PBBFAC,,, | Performed by: OPHTHALMOLOGY

## 2023-08-21 PROCEDURE — 1159F PR MEDICATION LIST DOCUMENTED IN MEDICAL RECORD: ICD-10-PCS | Mod: CPTII,S$GLB,, | Performed by: OPHTHALMOLOGY

## 2023-08-21 PROCEDURE — 99214 OFFICE O/P EST MOD 30 MIN: CPT | Mod: S$GLB,,, | Performed by: OPHTHALMOLOGY

## 2023-08-21 PROCEDURE — 1159F MED LIST DOCD IN RCRD: CPT | Mod: CPTII,S$GLB,, | Performed by: OPHTHALMOLOGY

## 2023-08-21 PROCEDURE — 1160F PR REVIEW ALL MEDS BY PRESCRIBER/CLIN PHARMACIST DOCUMENTED: ICD-10-PCS | Mod: CPTII,S$GLB,, | Performed by: OPHTHALMOLOGY

## 2023-08-21 NOTE — PROGRESS NOTES
HPI     Glaucoma            Comments: Patient reports for 4 month IOP check. Using gtts as advised.   Denies pain or irritation at this time. Patient reports of visual   stability since previous visit.             Comments    1. Glaucoma   Glaucoma Laser OD at [place] x early 2015 - no response per patient   2. +DM   3. Corneal FB removed OS 04/07/17   [facility]  5. PCIOL OS W/ Goniotomy 5/7/2020 SN60WF +19.5 (cde 7.60)/istent ?   6. PCIOL OD W/ GONIOTOMY 1/7/2021 (CDE 6.22, SN60WF 19.5)   7. Ptosis repair OU 2/6/23 (Dr Emmanuel Nelson at [facility])    GCL: 34/34- 6/2021  Gcl: 32/33 -- 2/2023        Timolol QAM OU            Last edited by Dejuan Farah on 8/21/2023  7:53 AM.            Assessment /Plan     For exam results, see Encounter Report.      ICD-10-CM ICD-9-CM    1. Primary open angle glaucoma of both eyes, mild stage  H40.1131 365.11 Doing well - intraocular pressure is within acceptable range relative to target pressure with no evidence of progression.   Continue current treatment.  Reviewed importance of continued compliance with treatment and follow up.        365.71       2. Type 2 diabetes mellitus with other specified complication, without long-term current use of insulin  E11.69 250.80 Due for dilation next visit      3. Ptosis of both eyelids  H02.403 374.30 s/p repair with Dr Nelson - doing well          Timolol q am OU  Return to clinic 6 months for iop, gOCT, and dilation.

## 2024-03-28 DIAGNOSIS — H40.1131 PRIMARY OPEN ANGLE GLAUCOMA OF BOTH EYES, MILD STAGE: ICD-10-CM

## 2024-04-01 RX ORDER — TIMOLOL MALEATE 5 MG/ML
SOLUTION/ DROPS OPHTHALMIC
Qty: 15 ML | Refills: 3 | Status: SHIPPED | OUTPATIENT
Start: 2024-04-01

## 2024-04-15 ENCOUNTER — OFFICE VISIT (OUTPATIENT)
Dept: OPHTHALMOLOGY | Facility: CLINIC | Age: 69
End: 2024-04-15
Payer: MEDICARE

## 2024-04-15 DIAGNOSIS — H02.403 PTOSIS OF BOTH EYELIDS: ICD-10-CM

## 2024-04-15 DIAGNOSIS — E11.69 TYPE 2 DIABETES MELLITUS WITH OTHER SPECIFIED COMPLICATION, WITHOUT LONG-TERM CURRENT USE OF INSULIN: ICD-10-CM

## 2024-04-15 DIAGNOSIS — H40.1131 PRIMARY OPEN ANGLE GLAUCOMA OF BOTH EYES, MILD STAGE: Primary | ICD-10-CM

## 2024-04-15 DIAGNOSIS — H04.123 DRY EYE SYNDROME OF BOTH EYES: ICD-10-CM

## 2024-04-15 PROCEDURE — 4010F ACE/ARB THERAPY RXD/TAKEN: CPT | Mod: CPTII,S$GLB,, | Performed by: OPHTHALMOLOGY

## 2024-04-15 PROCEDURE — 99999 PR PBB SHADOW E&M-EST. PATIENT-LVL III: CPT | Mod: PBBFAC,,, | Performed by: OPHTHALMOLOGY

## 2024-04-15 PROCEDURE — 92133 CPTRZD OPH DX IMG PST SGM ON: CPT | Mod: S$GLB,,, | Performed by: OPHTHALMOLOGY

## 2024-04-15 PROCEDURE — 2022F DILAT RTA XM EVC RTNOPTHY: CPT | Mod: CPTII,S$GLB,, | Performed by: OPHTHALMOLOGY

## 2024-04-15 PROCEDURE — 1159F MED LIST DOCD IN RCRD: CPT | Mod: CPTII,S$GLB,, | Performed by: OPHTHALMOLOGY

## 2024-04-15 PROCEDURE — 99214 OFFICE O/P EST MOD 30 MIN: CPT | Mod: S$GLB,,, | Performed by: OPHTHALMOLOGY

## 2024-04-15 PROCEDURE — 1160F RVW MEDS BY RX/DR IN RCRD: CPT | Mod: CPTII,S$GLB,, | Performed by: OPHTHALMOLOGY

## 2024-04-15 NOTE — PROGRESS NOTES
HPI     Glaucoma            Comments: Pt states he is taking gtts as directed   Pt co fb sensation OS, tries to rinse  Pt states va ou is stable           Comments    1. Glaucoma   Glaucoma Laser OD at [place] x early 2015 - no response per patient   2. +DM   3. Corneal FB removed OS 04/07/17   4. PRP OS ([facility] 2017, 2022)  5. PCIOL OS W/ Goniotomy 5/7/2020 SN60WF +19.5 (cde 7.60)/istent ?   6. PCIOL OD W/ GONIOTOMY 1/7/2021 (CDE 6.22, SN60WF 19.5)   7. Ptosis repair OU 2/6/23 (Dr Emmanuel Nelson at [facility])    GCL: 34/34- 6/2021  Gcl: 32/33 -- 2/2023        Timolol QAM OU             Last edited by Sehr Stone on 4/15/2024  9:03 AM.            Assessment /Plan     For exam results, see Encounter Report.      ICD-10-CM ICD-9-CM    1. Primary open angle glaucoma of both eyes, mild stage  H40.1131 365.11 Posterior Segment OCT Optic Nerve- Both eyes    Doing well - intraocular pressure is within acceptable range relative to target pressure with no evidence of progression.   Continue current treatment.  Reviewed importance of continued compliance with treatment and follow up.        365.71       2. Type 2 diabetes mellitus with other specified complication, without long-term current use of insulin  E11.69 250.80 Diabetes with no diabetic retinopathy on dilated exam.   Reviewed diabetic eye precautions including excellent blood sugar control, and importance of regular follow up.         3. Ptosis of both eyelids  H02.403 374.30 S/p ptosis repair, 2/6/23 doing well    4. Dry eye   Findings and symptoms consistent with mild dry eyes.   Recommend regular use of Artificial Tears. These should be thought of as Ocular Surface Moisturizers similar to skin moisturizers in that regular use is required to achieve maximum benefit.  Specifically, I recommend the following:    Systane Hydration: 3-4 times a day.   The first dose upon awakening is most important because we do not make tears at night.  Avoid generic products as they  contain Benzalkonium Chloride as a preservative. This is a very irritating chemical and can make your eyes worse.    Omega 3 Fish Oils  1000 to 2000 mgs per day of Nordic Naturals or PRN Dry Eye formula [facility]           Return to clinic 6 months for IOP        Timolol QAM OU

## 2024-09-30 ENCOUNTER — HOSPITAL ENCOUNTER (EMERGENCY)
Facility: HOSPITAL | Age: 69
Discharge: HOME | End: 2024-09-30
Attending: EMERGENCY MEDICINE
Payer: MEDICARE

## 2024-09-30 VITALS
RESPIRATION RATE: 18 BRPM | DIASTOLIC BLOOD PRESSURE: 85 MMHG | OXYGEN SATURATION: 95 % | SYSTOLIC BLOOD PRESSURE: 135 MMHG | HEART RATE: 53 BPM | BODY MASS INDEX: 28.47 KG/M2 | WEIGHT: 198.44 LBS | TEMPERATURE: 98 F

## 2024-09-30 DIAGNOSIS — N18.9 CHRONIC RENAL IMPAIRMENT, UNSPECIFIED CKD STAGE: ICD-10-CM

## 2024-09-30 DIAGNOSIS — R53.1 WEAKNESS: ICD-10-CM

## 2024-09-30 DIAGNOSIS — R06.6 HICCUPS: Primary | ICD-10-CM

## 2024-09-30 LAB
ALBUMIN SERPL BCP-MCNC: 4.3 G/DL (ref 3.5–5.2)
ALP SERPL-CCNC: 52 U/L (ref 55–135)
ALT SERPL W/O P-5'-P-CCNC: 43 U/L (ref 10–44)
ANION GAP SERPL CALC-SCNC: 10 MMOL/L (ref 8–16)
AST SERPL-CCNC: 49 U/L (ref 10–40)
BASOPHILS # BLD AUTO: 0.06 K/UL (ref 0–0.2)
BASOPHILS NFR BLD: 0.6 % (ref 0–1.9)
BILIRUB SERPL-MCNC: 0.7 MG/DL (ref 0.1–1)
BUN SERPL-MCNC: 19 MG/DL (ref 8–23)
CALCIUM SERPL-MCNC: 9.5 MG/DL (ref 8.7–10.5)
CHLORIDE SERPL-SCNC: 105 MMOL/L (ref 95–110)
CO2 SERPL-SCNC: 21 MMOL/L (ref 23–29)
CREAT SERPL-MCNC: 1.5 MG/DL (ref 0.5–1.4)
DIFFERENTIAL METHOD BLD: ABNORMAL
EOSINOPHIL # BLD AUTO: 0.1 K/UL (ref 0–0.5)
EOSINOPHIL NFR BLD: 1.4 % (ref 0–8)
ERYTHROCYTE [DISTWIDTH] IN BLOOD BY AUTOMATED COUNT: 14.5 % (ref 11.5–14.5)
EST. GFR  (NO RACE VARIABLE): 50 ML/MIN/1.73 M^2
GLUCOSE SERPL-MCNC: 131 MG/DL (ref 70–110)
HCT VFR BLD AUTO: 50.4 % (ref 40–54)
HCV AB SERPL QL IA: NEGATIVE
HEP C VIRUS HOLD SPECIMEN: NORMAL
HGB BLD-MCNC: 16.8 G/DL (ref 14–18)
HIV 1+2 AB+HIV1 P24 AG SERPL QL IA: NEGATIVE
IMM GRANULOCYTES # BLD AUTO: 0.09 K/UL (ref 0–0.04)
IMM GRANULOCYTES NFR BLD AUTO: 0.9 % (ref 0–0.5)
LYMPHOCYTES # BLD AUTO: 3.1 K/UL (ref 1–4.8)
LYMPHOCYTES NFR BLD: 29.7 % (ref 18–48)
MCH RBC QN AUTO: 29.4 PG (ref 27–31)
MCHC RBC AUTO-ENTMCNC: 33.3 G/DL (ref 32–36)
MCV RBC AUTO: 88 FL (ref 82–98)
MONOCYTES # BLD AUTO: 1.1 K/UL (ref 0.3–1)
MONOCYTES NFR BLD: 10.2 % (ref 4–15)
NEUTROPHILS # BLD AUTO: 5.9 K/UL (ref 1.8–7.7)
NEUTROPHILS NFR BLD: 57.2 % (ref 38–73)
NRBC BLD-RTO: 0 /100 WBC
OHS QRS DURATION: 78 MS
OHS QTC CALCULATION: 411 MS
PLATELET # BLD AUTO: 198 K/UL (ref 150–450)
PMV BLD AUTO: 10.9 FL (ref 9.2–12.9)
POTASSIUM SERPL-SCNC: 4.7 MMOL/L (ref 3.5–5.1)
PROT SERPL-MCNC: 7.7 G/DL (ref 6–8.4)
RBC # BLD AUTO: 5.72 M/UL (ref 4.6–6.2)
SODIUM SERPL-SCNC: 136 MMOL/L (ref 136–145)
TROPONIN I SERPL DL<=0.01 NG/ML-MCNC: <0.006 NG/ML (ref 0–0.03)
WBC # BLD AUTO: 10.36 K/UL (ref 3.9–12.7)

## 2024-09-30 PROCEDURE — 86803 HEPATITIS C AB TEST: CPT | Performed by: EMERGENCY MEDICINE

## 2024-09-30 PROCEDURE — 63600175 PHARM REV CODE 636 W HCPCS: Performed by: EMERGENCY MEDICINE

## 2024-09-30 PROCEDURE — 93010 ELECTROCARDIOGRAM REPORT: CPT | Mod: ,,, | Performed by: INTERNAL MEDICINE

## 2024-09-30 PROCEDURE — 80053 COMPREHEN METABOLIC PANEL: CPT | Performed by: EMERGENCY MEDICINE

## 2024-09-30 PROCEDURE — 93005 ELECTROCARDIOGRAM TRACING: CPT

## 2024-09-30 PROCEDURE — 84484 ASSAY OF TROPONIN QUANT: CPT | Performed by: EMERGENCY MEDICINE

## 2024-09-30 PROCEDURE — 96372 THER/PROPH/DIAG INJ SC/IM: CPT | Performed by: EMERGENCY MEDICINE

## 2024-09-30 PROCEDURE — 36415 COLL VENOUS BLD VENIPUNCTURE: CPT | Performed by: EMERGENCY MEDICINE

## 2024-09-30 PROCEDURE — 87389 HIV-1 AG W/HIV-1&-2 AB AG IA: CPT | Performed by: EMERGENCY MEDICINE

## 2024-09-30 PROCEDURE — 99285 EMERGENCY DEPT VISIT HI MDM: CPT | Mod: 25

## 2024-09-30 PROCEDURE — 85025 COMPLETE CBC W/AUTO DIFF WBC: CPT | Performed by: EMERGENCY MEDICINE

## 2024-09-30 RX ORDER — CHLORPROMAZINE HCI 25 MG/ML
25 INJECTION INTRAMUSCULAR
Status: COMPLETED | OUTPATIENT
Start: 2024-09-30 | End: 2024-09-30

## 2024-09-30 RX ORDER — CHLORPROMAZINE HYDROCHLORIDE 10 MG/1
10 TABLET, FILM COATED ORAL 3 TIMES DAILY PRN
Qty: 30 TABLET | Refills: 0 | Status: SHIPPED | OUTPATIENT
Start: 2024-09-30 | End: 2025-09-30

## 2024-09-30 RX ADMIN — CHLORPROMAZINE HYDROCHLORIDE 25 MG: 25 INJECTION INTRAMUSCULAR at 08:09

## 2024-09-30 NOTE — DISCHARGE INSTRUCTIONS
Discontinue the medicine provided by the other physician.  Use Thorazine as prescribed.  Follow up with her doctor in 1-2 days for re-evaluation return as needed

## 2024-09-30 NOTE — ED PROVIDER NOTES
SCRIBE #1 NOTE: I, Anatoliy Castle, am scribing for, and in the presence of, Reza Crowe Jr., MD. I have scribed the entire note.       History     Chief Complaint   Patient presents with    Hiccups     Pt. Is complaining of hiccups x 5 days. Was placed on medication by his NP 5 days ago without relief.      Review of patient's allergies indicates:   Allergen Reactions    Diph,pertuss(acel),tet vac(pf) Anaphylaxis    Azithromycin Hives    Penicillin g potassium Other (See Comments)    E-mycin [erythromycin] Rash         History of Present Illness     HPI    9/30/2024, 7:28 AM  History obtained from the patient      History of Present Illness: Sixto Joseph is a 69 y.o. male patient with a PMHx of arthritis, glaucoma, gout, cortical cataract, and DM Type 2 who presents to the Emergency Department for evaluation of hiccups which onset gradually the past 5 days. Symptoms are constant and moderate in severity. No mitigating or exacerbating factors reported. No associated sxs reported. Patient denies any CP, HA, SOB, n/v, and all other sxs at this time. Prior Tx includes taking Metoclo by his NP 5 days ago without relief. No further complaints or concerns at this time.  Patient was has been started on Reglan as an outpatient without any improvement.      Arrival mode: Personal Transportation    PCP: Beth Gonzalez MD        Past Medical History:  Past Medical History:   Diagnosis Date    Arthritis     Circumcision complication     Cortical cataract of both eyes 10/19/2015    Digestive disorder     Glaucoma     Gout     Type 2 diabetes mellitus without complication, without long-term current use of insulin 02/04/2019       Past Surgical History:  Past Surgical History:   Procedure Laterality Date    ARTHROSCOPIC REPAIR OF ROTATOR CUFF OF SHOULDER Left 4/5/2023    Procedure: REPAIR, ROTATOR CUFF, ARTHROSCOPIC Arthrex, lateral shoulder traction, bean bag, block;  Surgeon: Juanpablo Bourgeois MD;  Location: [facility] OR;   Service: Orthopedics;  Laterality: Left;  Arthrex, lateral shoulder traction, bean bag, block    ARTHROSCOPY OF SHOULDER WITH REMOVAL OF DISTAL CLAVICLE Left 4/5/2023    Procedure: ARTHROSCOPY, SHOULDER, WITH DISTAL CLAVICLE EXCISION;  Surgeon: Juanpablo Bourgeois MD;  Location: [facility] OR;  Service: Orthopedics;  Laterality: Left;    ARTHROSCOPY,SHOULDER,WITH BICEPS TENODESIS Left 4/5/2023    Procedure: ARTHROSCOPY,SHOULDER,WITH BICEPS TENODESIS;  Surgeon: Juanpablo Bourgeois MD;  Location: [facility] OR;  Service: Orthopedics;  Laterality: Left;    CARPAL TUNNEL RELEASE Bilateral     CATARACT EXTRACTION W/  INTRAOCULAR LENS IMPLANT Bilateral     ESOPHAGUS SURGERY      dilation    GASTROSCOPY      with botox injection    WRIST SURGERY Bilateral     ORIF         Family History:  Family History   Problem Relation Name Age of Onset    Glaucoma Sister      Glaucoma Maternal Grandmother         Social History:  Social History     Tobacco Use    Smoking status: Former     Types: Cigars    Smokeless tobacco: Never    Tobacco comments:     marijuana   Substance and Sexual Activity    Alcohol use: Yes     Alcohol/week: 1.0 standard drink of alcohol     Types: 1 Cans of beer per week     Comment: rarely    Drug use: Not Currently    Sexual activity: Not Currently        Review of Systems     Review of Systems   Constitutional:  Negative for fever.   HENT:  Negative for sore throat.         (+) hiccups    Respiratory:  Negative for shortness of breath.    Cardiovascular:  Negative for chest pain.   Gastrointestinal:  Negative for nausea and vomiting.   Genitourinary:  Negative for dysuria.   Musculoskeletal:  Negative for back pain.   Skin:  Negative for rash.   Neurological:  Negative for weakness and headaches.   Hematological:  Does not bruise/bleed easily.   All other systems reviewed and are negative.     Physical Exam     Initial Vitals [09/30/24 0711]   BP Pulse Resp Temp SpO2   (!) 155/87 66 18 97.8 °F (36.6 °C) 97 %      MAP       --           Physical Exam  Nursing Notes and Vital Signs Reviewed.  Constitutional: Patient is in no acute distress. Well-developed and well-nourished.  Patient was with a hiccups  Head: Atraumatic. Normocephalic.  Eyes:  EOM intact.  No scleral icterus.  ENT: Mucous membranes are moist.  Nares clear   Neck:  Full ROM. No JVD.  Cardiovascular: Regular rate. Regular rhythm No murmurs, rubs, or gallops. Distal pulses are 2+ and symmetric  Pulmonary/Chest: No respiratory distress. Clear to auscultation bilaterally. No wheezing or rales.  Equal chest wall rise bilaterally  Abdominal: Soft and non-distended.  There is no tenderness.  No rebound, guarding, or rigidity. Good bowel sounds.  Genitourinary: No CVA tenderness.  No suprapubic tenderness  Musculoskeletal: Moves all extremities. No obvious deformities.  5 x 5 strength in all extremities   Skin: Warm and dry.  Neurological:  Alert, awake, and appropriate.  Normal speech.  No acute focal neurological deficits are appreciated.  Two through 12 intact bilaterally.  Psychiatric: Normal affect. Good eye contact. Appropriate in content.     ED Course   Procedures  ED Vital Signs:  Vitals:    09/30/24 0711 09/30/24 0727 09/30/24 0855   BP: (!) 155/87 (!) 165/97 135/85   Pulse: 66 60 (!) 53   Resp: 18  18   Temp: 97.8 °F (36.6 °C)     TempSrc: Oral     SpO2: 97% 95% 95%   Weight: 90 kg (198 lb 6.6 oz)         Abnormal Lab Results:  Labs Reviewed   CBC W/ AUTO DIFFERENTIAL - Abnormal       Result Value    WBC 10.36      RBC 5.72      Hemoglobin 16.8      Hematocrit 50.4      MCV 88      MCH 29.4      MCHC 33.3      RDW 14.5      Platelets 198      MPV 10.9      Immature Granulocytes 0.9 (*)     Gran # (ANC) 5.9      Immature Grans (Abs) 0.09 (*)     Lymph # 3.1      Mono # 1.1 (*)     Eos # 0.1      Baso # 0.06      nRBC 0      Gran % 57.2      Lymph % 29.7      Mono % 10.2      Eosinophil % 1.4      Basophil % 0.6      Differential Method Automated      Narrative:     Release to  patient->Immediate   COMPREHENSIVE METABOLIC PANEL - Abnormal    Sodium 136      Potassium 4.7      Chloride 105      CO2 21 (*)     Glucose 131 (*)     BUN 19      Creatinine 1.5 (*)     Calcium 9.5      Total Protein 7.7      Albumin 4.3      Total Bilirubin 0.7      Alkaline Phosphatase 52 (*)     AST 49 (*)     ALT 43      eGFR 50 (*)     Anion Gap 10      Narrative:     Release to patient->Immediate   HIV 1 / 2 ANTIBODY    HIV 1/2 Ag/Ab Negative      Narrative:     Release to patient->Immediate   HEPATITIS C ANTIBODY    Hepatitis C Ab Negative      Narrative:     Release to patient->Immediate   HEP C VIRUS HOLD SPECIMEN    HEP C Virus Hold Specimen Hold for HCV sendout      Narrative:     Release to patient->Immediate   TROPONIN I    Troponin I <0.006      Narrative:     Release to patient->Immediate        All Lab Results:  Results for orders placed or performed during the hospital encounter of 09/30/24   EKG 12-lead (Weakness) Age > 50    Collection Time: 09/30/24  7:12 AM   Result Value Ref Range    QRS Duration 78 ms    [facility] QTC Calculation 411 ms   HIV 1/2 Ag/Ab (4th Gen)    Collection Time: 09/30/24  8:08 AM   Result Value Ref Range    HIV 1/2 Ag/Ab Negative Negative   Hepatitis C Antibody    Collection Time: 09/30/24  8:08 AM   Result Value Ref Range    Hepatitis C Ab Negative Negative   HCV Virus Hold Specimen    Collection Time: 09/30/24  8:08 AM   Result Value Ref Range    HEP C Virus Hold Specimen Hold for HCV sendout    CBC auto differential    Collection Time: 09/30/24  8:08 AM   Result Value Ref Range    WBC 10.36 3.90 - 12.70 K/uL    RBC 5.72 4.60 - 6.20 M/uL    Hemoglobin 16.8 14.0 - 18.0 g/dL    Hematocrit 50.4 40.0 - 54.0 %    MCV 88 82 - 98 fL    MCH 29.4 27.0 - 31.0 pg    MCHC 33.3 32.0 - 36.0 g/dL    RDW 14.5 11.5 - 14.5 %    Platelets 198 150 - 450 K/uL    MPV 10.9 9.2 - 12.9 fL    Immature Granulocytes 0.9 (H) 0.0 - 0.5 %    Gran # (ANC) 5.9 1.8 - 7.7 K/uL    Immature Grans (Abs) 0.09 (H)  0.00 - 0.04 K/uL    Lymph # 3.1 1.0 - 4.8 K/uL    Mono # 1.1 (H) 0.3 - 1.0 K/uL    Eos # 0.1 0.0 - 0.5 K/uL    Baso # 0.06 0.00 - 0.20 K/uL    nRBC 0 0 /100 WBC    Gran % 57.2 38.0 - 73.0 %    Lymph % 29.7 18.0 - 48.0 %    Mono % 10.2 4.0 - 15.0 %    Eosinophil % 1.4 0.0 - 8.0 %    Basophil % 0.6 0.0 - 1.9 %    Differential Method Automated    Comprehensive metabolic panel    Collection Time: 09/30/24  8:08 AM   Result Value Ref Range    Sodium 136 136 - 145 mmol/L    Potassium 4.7 3.5 - 5.1 mmol/L    Chloride 105 95 - 110 mmol/L    CO2 21 (L) 23 - 29 mmol/L    Glucose 131 (H) 70 - 110 mg/dL    BUN 19 8 - 23 mg/dL    Creatinine 1.5 (H) 0.5 - 1.4 mg/dL    Calcium 9.5 8.7 - 10.5 mg/dL    Total Protein 7.7 6.0 - 8.4 g/dL    Albumin 4.3 3.5 - 5.2 g/dL    Total Bilirubin 0.7 0.1 - 1.0 mg/dL    Alkaline Phosphatase 52 (L) 55 - 135 U/L    AST 49 (H) 10 - 40 U/L    ALT 43 10 - 44 U/L    eGFR 50 (A) >60 mL/min/1.73 m^2    Anion Gap 10 8 - 16 mmol/L   Troponin I    Collection Time: 09/30/24  8:08 AM   Result Value Ref Range    Troponin I <0.006 0.000 - 0.026 ng/mL         Imaging Results:  Imaging Results              X-Ray Chest AP Portable (Final result)  Result time 09/30/24 08:29:51      Final result by Joon Sparks MD (09/30/24 08:29:51)                   Impression:      No acute findings.      Electronically signed by: Joon Sparks MD  Date:    09/30/2024  Time:    08:29               Narrative:    EXAMINATION:  XR CHEST AP PORTABLE    CLINICAL HISTORY:  Chest pain., Hiccups;    COMPARISON:  03/28/2023 x-ray    FINDINGS:  Heart size is normal. The lung fields are clear. No acute cardiopulmonary infiltrate.                                       The EKG was ordered, reviewed, and independently interpreted by the ED provider.  Interpretation time: 7:12  Rate: 63 BPM  Rhythm: Normal sinus rhythm with sinus arrhythmia   Interpretation: Rightward axis. ST elevation consider inferior injury or acute infarct.  Consider right ventricular involvement in acute inferior infarct. Early repolongate, stable from last. No STEMI.           The Emergency Provider reviewed the vital signs and test results, which are outlined above.     ED Discussion     9:53 AM: Reassessed pt at this time. Discussed with pt all pertinent ED information and results. Discussed pt dx and plan of tx. Gave pt all f/u and return to the ED instructions. All questions and concerns were addressed at this time. Pt expresses understanding of information and instructions, and is comfortable with plan to discharge. Pt is stable for discharge.    I discussed with patient and/or family/caretaker that evaluation in the ED does not suggest any emergent or life threatening medical conditions requiring immediate intervention beyond what was provided in the ED, and I believe patient is safe for discharge.  Regardless, an unremarkable evaluation in the ED does not preclude the development or presence of a serious of life threatening condition. As such, patient was instructed to return immediately for any worsening or change in current symptoms.      ED Course as of 09/30/24 1002   Mon Sep 30, 2024   0831 Cardiac monitor interpretation  Independent interpretation  Indication: Hiccups  Normal sinus rhythm.  Rate 58.  No STEMI [RT]   0831 Vagal maneuvers performed by gag in the patient was supra orbital pressure.  Patient hiccups have resolved [RT]      ED Course User Index  [RT] Reza Crowe Jr., MD     Medical Decision Making  Differential diagnosis: Hiccups, pneumonia, diaphragmatic injury, phrenic nerve irritation    Patient was evaluated history physical examination.  Patient was with a hiccups for several days refractory Reglan.  Patient was gagged with supra orbital nerve compression with resolution of symptoms.  These did resolve however as he did not receive his Thorazine until later.  He was subsequently repeated both procedures with Thorazine the patient  did have resolution.  He was stable safe for discharge.  Initial EKG shows some ST elevation in the inferior leads.  This is baseline and stable.  Appears to be early repolarization.  He was no cardiac symptoms    Amount and/or Complexity of Data Reviewed  External Data Reviewed: labs, ECG and notes.  Labs: ordered. Decision-making details documented in ED Course.     Details: Troponin is negative.  He was undetectable with 5 days of symptoms.  Cleared not a cardiac issue.  CMP is benign save for a mild elevation creatinine 1.5 however this also appears to be baseline.  Has a normal white count normal H&H  Radiology: ordered. Decision-making details documented in ED Course.     Details: No acute findings on chest film  ECG/medicine tests: ordered and independent interpretation performed. Decision-making details documented in ED Course.     Details: Mild ST elevation in the inferior leads.  This appears to be early repolarization in his baseline    Risk  OTC drugs.  Prescription drug management.  Decision regarding hospitalization.                ED Medication(s):  Medications   chlorproMAZINE injection 25 mg (25 mg Intramuscular Given 9/30/24 0853)       New Prescriptions    CHLORPROMAZINE (THORAZINE) 10 MG TABLET    Take 1 tablet (10 mg total) by mouth 3 (three) times daily as needed (hiccups).        Follow-up Information       Beth Gonzalez MD.    Specialty: Family Medicine  Contact information:  [address] [facility] [address] [phone]                                 Scribe Attestation:   Scribe #1: I performed the above scribed service and the documentation accurately describes the services I performed. I attest to the accuracy of the note.     Attending:   Physician Attestation Statement for Scribe #1: I, Reza Crowe Jr., MD, personally performed the services described in this documentation, as scribed by Anatoliy Castle, in my presence, and it is both accurate and complete.            Clinical Impression       ICD-10-CM ICD-9-CM   1. Hiccups  R06.6 786.8   2. Weakness  R53.1 780.79   3. Chronic renal impairment, unspecified CKD stage  N18.9 585.9       Disposition:   Disposition: Discharged  Condition: Stable       Reza Crowe Jr., MD  09/30/24 1002

## 2024-10-21 ENCOUNTER — OFFICE VISIT (OUTPATIENT)
Dept: OPHTHALMOLOGY | Facility: CLINIC | Age: 69
End: 2024-10-21
Payer: MEDICARE

## 2024-10-21 DIAGNOSIS — H40.1131 PRIMARY OPEN ANGLE GLAUCOMA OF BOTH EYES, MILD STAGE: Primary | ICD-10-CM

## 2024-10-21 DIAGNOSIS — Z96.1 PSEUDOPHAKIA OF BOTH EYES: ICD-10-CM

## 2024-10-21 DIAGNOSIS — H02.403 PTOSIS OF BOTH EYELIDS: ICD-10-CM

## 2024-10-21 DIAGNOSIS — H04.123 DRY EYE SYNDROME OF BOTH EYES: ICD-10-CM

## 2024-10-21 DIAGNOSIS — E11.9 TYPE 2 DIABETES MELLITUS WITHOUT COMPLICATION, WITHOUT LONG-TERM CURRENT USE OF INSULIN: ICD-10-CM

## 2024-10-21 PROCEDURE — 99214 OFFICE O/P EST MOD 30 MIN: CPT | Mod: S$GLB,,, | Performed by: OPHTHALMOLOGY

## 2024-10-21 PROCEDURE — 1159F MED LIST DOCD IN RCRD: CPT | Mod: CPTII,S$GLB,, | Performed by: OPHTHALMOLOGY

## 2024-10-21 PROCEDURE — 4010F ACE/ARB THERAPY RXD/TAKEN: CPT | Mod: CPTII,S$GLB,, | Performed by: OPHTHALMOLOGY

## 2024-10-21 PROCEDURE — 99999 PR PBB SHADOW E&M-EST. PATIENT-LVL I: CPT | Mod: PBBFAC,,, | Performed by: OPHTHALMOLOGY

## 2024-10-21 PROCEDURE — 1160F RVW MEDS BY RX/DR IN RCRD: CPT | Mod: CPTII,S$GLB,, | Performed by: OPHTHALMOLOGY

## 2025-05-26 ENCOUNTER — OFFICE VISIT (OUTPATIENT)
Dept: OPHTHALMOLOGY | Facility: CLINIC | Age: 70
End: 2025-05-26
Payer: MEDICARE

## 2025-05-26 DIAGNOSIS — H40.1131 PRIMARY OPEN ANGLE GLAUCOMA OF BOTH EYES, MILD STAGE: Primary | ICD-10-CM

## 2025-05-26 DIAGNOSIS — E11.9 TYPE 2 DIABETES MELLITUS WITHOUT COMPLICATION, WITHOUT LONG-TERM CURRENT USE OF INSULIN: ICD-10-CM

## 2025-05-26 DIAGNOSIS — H02.403 PTOSIS OF BOTH EYELIDS: ICD-10-CM

## 2025-05-26 DIAGNOSIS — H04.123 DRY EYE SYNDROME OF BOTH EYES: ICD-10-CM

## 2025-05-26 PROCEDURE — 99999 PR PBB SHADOW E&M-EST. PATIENT-LVL III: CPT | Mod: PBBFAC,,, | Performed by: OPHTHALMOLOGY

## 2025-05-26 PROCEDURE — G2211 COMPLEX E/M VISIT ADD ON: HCPCS | Mod: S$GLB,,, | Performed by: OPHTHALMOLOGY

## 2025-05-26 PROCEDURE — 99214 OFFICE O/P EST MOD 30 MIN: CPT | Mod: S$GLB,,, | Performed by: OPHTHALMOLOGY

## 2025-05-26 PROCEDURE — 1160F RVW MEDS BY RX/DR IN RCRD: CPT | Mod: CPTII,S$GLB,, | Performed by: OPHTHALMOLOGY

## 2025-05-26 PROCEDURE — 92133 CPTRZD OPH DX IMG PST SGM ON: CPT | Mod: S$GLB,,, | Performed by: OPHTHALMOLOGY

## 2025-05-26 PROCEDURE — 1159F MED LIST DOCD IN RCRD: CPT | Mod: CPTII,S$GLB,, | Performed by: OPHTHALMOLOGY

## 2025-05-26 PROCEDURE — 2022F DILAT RTA XM EVC RTNOPTHY: CPT | Mod: CPTII,S$GLB,, | Performed by: OPHTHALMOLOGY

## 2025-05-26 NOTE — PROGRESS NOTES
HPI     Glaucoma            Comments: Pt reports for 6m GOCT dil. Denies any pain or irritation. Va   stable. Using drops as directed. Noted white discharge OU near nose. Noted   slight FBS OS in the past, now resolved. [place] like he has more pressure OD   than OS. Saw Dr. Groves recently to check retina.           Comments    1. Glaucoma   Glaucoma Laser OD at [facility] x early 2015 - no response per patient   2. +DM   3. Corneal FB removed OS 04/07/17   4. PRP OS ([place] 2017, 2022)  5. PCIOL OS W/ Goniotomy 5/7/2020 SN60WF +19.5 (cde 7.60)/istent ?   6. PCIOL OD W/ GONIOTOMY 1/7/2021 (CDE 6.22, SN60WF 19.5)   7. Ptosis repair OU 2/6/23 (Dr Emmanuel Nelson at [facility])      Timolol QAM OU             Last edited by Rony Lock on 5/26/2025 10:14 AM.            Assessment /Plan     For exam results, see Encounter Report.      ICD-10-CM ICD-9-CM    1. Primary open angle glaucoma of both eyes, mild stage  H40.1131 365.11 Posterior Segment OCT Optic Nerve- Both eyes  Doing well - intraocular pressure is within acceptable range relative to target pressure with no evidence of progression.   Continue current treatment.  Reviewed importance of continued compliance with treatment and follow up.     Visit today included increased complexity associated with the care and management of glaucoma. Patient aware that management of medical condition requires long term follow up.  Written instructions were placed in the portal for the patient upon closure of the encounter regarding specific use of the required medications.         365.71       2. Ptosis of both eyelids  H02.403 374.30 S/p repair      3. Dry eye syndrome of both eyes  H04.123 375.15       4. Type 2 diabetes mellitus without complication, without long-term current use of insulin  E11.9 250.00 Diabetes with no diabetic retinopathy on dilated exam.   Reviewed diabetic eye precautions including excellent blood sugar control, and importance of regular follow up.                Timolol QAM OU     Return to clinic 6 months

## 2025-08-18 ENCOUNTER — OFFICE VISIT (OUTPATIENT)
Dept: OPHTHALMOLOGY | Facility: CLINIC | Age: 70
End: 2025-08-18
Payer: MEDICARE

## 2025-08-18 DIAGNOSIS — T15.02XA: Primary | ICD-10-CM

## 2025-08-18 DIAGNOSIS — H02.88B MEIBOMIAN GLAND DISEASE OF UPPER AND LOWER EYELIDS OF BOTH EYES: ICD-10-CM

## 2025-08-18 DIAGNOSIS — H02.88A MEIBOMIAN GLAND DISEASE OF UPPER AND LOWER EYELIDS OF BOTH EYES: ICD-10-CM

## 2025-08-18 PROCEDURE — 99999 PR PBB SHADOW E&M-EST. PATIENT-LVL III: CPT | Mod: PBBFAC,,, | Performed by: OPTOMETRIST

## 2025-08-18 PROCEDURE — 92002 INTRM OPH EXAM NEW PATIENT: CPT | Mod: S$GLB,,, | Performed by: OPTOMETRIST

## 2025-08-18 PROCEDURE — 1159F MED LIST DOCD IN RCRD: CPT | Mod: CPTII,S$GLB,, | Performed by: OPTOMETRIST

## (undated) DEVICE — PAD ABD 8X10 STERILE

## (undated) DEVICE — SUT FIBERLINK TAPE BLU WHT 1.3

## (undated) DEVICE — APPLICATOR CHLORAPREP ORN 26ML

## (undated) DEVICE — GLOVE PROTEXIS BLUE LATEX 9

## (undated) DEVICE — BLADE SURG CARBON STEEL SZ11

## (undated) DEVICE — HOOK APOLLORF NON ASPIR 90DEG

## (undated) DEVICE — HOLDER STRIP-T SELF ADH 2X10IN

## (undated) DEVICE — DRAPE STERI INSTRUMENT 1018

## (undated) DEVICE — SUT MONOCRYL 3-0 PS-2 UND

## (undated) DEVICE — SOL NACL IRR 3000ML

## (undated) DEVICE — BURR OVAL 8 FLUTE 4MMX13CM

## (undated) DEVICE — CANNULA LOW PROFILE 7CM 5MM

## (undated) DEVICE — KIT ARTHREX ANGEL PRP

## (undated) DEVICE — TUBING PUMP ARTHROSCOPY STRL

## (undated) DEVICE — BENZOIN TINCTURE CAPSULET

## (undated) DEVICE — DRAPE U STD FILM LG 60X84IN

## (undated) DEVICE — GLOVE PROTEXIS HYDROGEL SZ6.5

## (undated) DEVICE — CANNULA PASSPORT 8 MM X 3CM

## (undated) DEVICE — NDL SUREFIRE SCORPION RC

## (undated) DEVICE — SET CASSETTE TUBE DW OUTFLOW

## (undated) DEVICE — TAPE CLOTH SOFT MEDIPORE 4IN

## (undated) DEVICE — KIT SURGICAL TURNOVER

## (undated) DEVICE — DRAPE SURGICAL STERI IRRG PCH

## (undated) DEVICE — Device

## (undated) DEVICE — GLOVE PROTEXIS HYDROGEL SZ9

## (undated) DEVICE — GOWN POLY REINF X-LONG 2XL

## (undated) DEVICE — SUT 0 36IN PDS II VIO MONO

## (undated) DEVICE — GLOVE 6.5 PROTEXIS PI BLUE

## (undated) DEVICE — SLEEVE LATERAL TRACTION ARM

## (undated) DEVICE — BLADE COOLCUT EXCALIBER 4X13

## (undated) DEVICE — CLOSURE SKIN STERI STRIP 1/2X4

## (undated) DEVICE — CUBE COLD THERAPY POLAR PAD

## (undated) DEVICE — NDL ANES SPINAL 18X3.5ST 18G

## (undated) DEVICE — DRAPE STERI U-SHAPED 47X51IN

## (undated) DEVICE — DRAPE SHOULDER BEACH CHAIR

## (undated) DEVICE — COVER MAYO STAND REINFRCD 30

## (undated) DEVICE — GAUZE NONWOVEN NS 3-PLY 4X4IN

## (undated) DEVICE — DRAPE INCISE IOBAN 2 23X23IN